# Patient Record
Sex: FEMALE | Race: WHITE | NOT HISPANIC OR LATINO | Employment: OTHER | URBAN - METROPOLITAN AREA
[De-identification: names, ages, dates, MRNs, and addresses within clinical notes are randomized per-mention and may not be internally consistent; named-entity substitution may affect disease eponyms.]

---

## 2017-04-12 ENCOUNTER — ALLSCRIPTS OFFICE VISIT (OUTPATIENT)
Dept: OTHER | Facility: OTHER | Age: 82
End: 2017-04-12

## 2017-04-12 DIAGNOSIS — E78.5 HYPERLIPIDEMIA: ICD-10-CM

## 2018-01-14 VITALS
DIASTOLIC BLOOD PRESSURE: 84 MMHG | WEIGHT: 147 LBS | HEIGHT: 61 IN | SYSTOLIC BLOOD PRESSURE: 138 MMHG | TEMPERATURE: 97.9 F | HEART RATE: 67 BPM | OXYGEN SATURATION: 94 % | BODY MASS INDEX: 27.75 KG/M2

## 2020-12-21 ENCOUNTER — TELEPHONE (OUTPATIENT)
Dept: OTHER | Facility: OTHER | Age: 85
End: 2020-12-21

## 2020-12-22 ENCOUNTER — NURSING HOME VISIT (OUTPATIENT)
Dept: GERIATRICS | Facility: OTHER | Age: 85
End: 2020-12-22
Payer: MEDICARE

## 2020-12-22 DIAGNOSIS — Z51.5 HOSPICE CARE PATIENT: ICD-10-CM

## 2020-12-22 DIAGNOSIS — M54.16 LUMBAR RADICULOPATHY: Primary | ICD-10-CM

## 2020-12-22 DIAGNOSIS — I10 BENIGN ESSENTIAL HYPERTENSION: ICD-10-CM

## 2020-12-22 DIAGNOSIS — D64.9 ANEMIA, UNSPECIFIED TYPE: ICD-10-CM

## 2020-12-22 DIAGNOSIS — H91.93 BILATERAL HEARING LOSS, UNSPECIFIED HEARING LOSS TYPE: ICD-10-CM

## 2020-12-22 DIAGNOSIS — N18.30 STAGE 3 CHRONIC KIDNEY DISEASE, UNSPECIFIED WHETHER STAGE 3A OR 3B CKD (HCC): ICD-10-CM

## 2020-12-22 DIAGNOSIS — F41.8 DEPRESSION WITH ANXIETY: ICD-10-CM

## 2020-12-22 DIAGNOSIS — R53.81 DEBILITY: ICD-10-CM

## 2020-12-22 PROCEDURE — 99306 1ST NF CARE HIGH MDM 50: CPT | Performed by: FAMILY MEDICINE

## 2020-12-27 PROBLEM — F41.8 DEPRESSION WITH ANXIETY: Status: ACTIVE | Noted: 2020-12-27

## 2020-12-27 PROBLEM — D64.9 ANEMIA: Status: ACTIVE | Noted: 2020-12-27

## 2020-12-27 PROBLEM — N18.30 CKD (CHRONIC KIDNEY DISEASE) STAGE 3, GFR 30-59 ML/MIN (HCC): Status: ACTIVE | Noted: 2020-12-27

## 2020-12-27 PROBLEM — K21.9 GERD (GASTROESOPHAGEAL REFLUX DISEASE): Status: ACTIVE | Noted: 2020-12-27

## 2020-12-27 PROBLEM — R53.81 DEBILITY: Status: ACTIVE | Noted: 2020-12-27

## 2020-12-27 PROBLEM — Z51.5 HOSPICE CARE PATIENT: Status: ACTIVE | Noted: 2020-12-27

## 2020-12-27 RX ORDER — MAG HYDROX/ALUMINUM HYD/SIMETH 400-400-40
30 SUSPENSION, ORAL (FINAL DOSE FORM) ORAL DAILY PRN
COMMUNITY
End: 2022-03-06

## 2020-12-27 RX ORDER — TRAMADOL HYDROCHLORIDE 50 MG/1
50 TABLET ORAL 4 TIMES DAILY
COMMUNITY
End: 2021-01-05 | Stop reason: SDUPTHER

## 2020-12-27 RX ORDER — SODIUM PHOSPHATE, DIBASIC AND SODIUM PHOSPHATE, MONOBASIC 7; 19 G/133ML; G/133ML
1 ENEMA RECTAL
COMMUNITY

## 2020-12-27 RX ORDER — HALOPERIDOL 2 MG/ML
0.5 SOLUTION ORAL EVERY 6 HOURS PRN
COMMUNITY
End: 2021-07-13

## 2020-12-27 RX ORDER — LIDOCAINE 40 MG/G
1 CREAM TOPICAL 2 TIMES DAILY
COMMUNITY

## 2020-12-27 RX ORDER — PROCHLORPERAZINE MALEATE 10 MG
10 TABLET ORAL EVERY 6 HOURS PRN
COMMUNITY
End: 2021-07-13

## 2020-12-27 RX ORDER — ACETAMINOPHEN 650 MG/1
650 SUPPOSITORY RECTAL EVERY 6 HOURS PRN
COMMUNITY

## 2020-12-27 RX ORDER — LORAZEPAM 0.5 MG/1
0.5 TABLET ORAL EVERY 6 HOURS PRN
COMMUNITY
End: 2021-08-19

## 2020-12-27 RX ORDER — ESCITALOPRAM OXALATE 10 MG/1
10 TABLET ORAL DAILY
COMMUNITY

## 2020-12-27 RX ORDER — BISACODYL 10 MG
10 SUPPOSITORY, RECTAL RECTAL DAILY PRN
COMMUNITY

## 2020-12-27 RX ORDER — QUETIAPINE FUMARATE 25 MG/1
25 TABLET, FILM COATED ORAL
COMMUNITY
End: 2022-02-18

## 2020-12-27 RX ORDER — MORPHINE SULFATE 20 MG/ML
0.25 SOLUTION ORAL
COMMUNITY
End: 2021-08-19

## 2020-12-27 RX ORDER — GABAPENTIN 100 MG/1
100 CAPSULE ORAL 3 TIMES DAILY
COMMUNITY

## 2020-12-27 RX ORDER — ACETAMINOPHEN 325 MG/1
650 TABLET ORAL EVERY 4 HOURS PRN
COMMUNITY

## 2021-01-05 DIAGNOSIS — M54.16 LUMBAR RADICULOPATHY: Primary | ICD-10-CM

## 2021-01-05 RX ORDER — TRAMADOL HYDROCHLORIDE 50 MG/1
50 TABLET ORAL 4 TIMES DAILY
Qty: 120 TABLET | Refills: 0 | Status: SHIPPED | OUTPATIENT
Start: 2021-01-05 | End: 2021-02-03 | Stop reason: SDUPTHER

## 2021-01-05 NOTE — TELEPHONE ENCOUNTER
Tramadol 50mg PO QID sent to [address] [place] [address]; one month supply, no refills  Hospice patient; PDMP reviewed

## 2021-01-28 ENCOUNTER — NURSING HOME VISIT (OUTPATIENT)
Dept: GERIATRICS | Facility: OTHER | Age: 86
End: 2021-01-28
Payer: MEDICARE

## 2021-01-28 DIAGNOSIS — F41.8 DEPRESSION WITH ANXIETY: ICD-10-CM

## 2021-01-28 DIAGNOSIS — M54.16 LUMBAR RADICULOPATHY: Primary | ICD-10-CM

## 2021-01-28 DIAGNOSIS — R53.81 DEBILITY: ICD-10-CM

## 2021-01-28 DIAGNOSIS — Z51.5 HOSPICE CARE PATIENT: ICD-10-CM

## 2021-01-28 DIAGNOSIS — N18.30 STAGE 3 CHRONIC KIDNEY DISEASE, UNSPECIFIED WHETHER STAGE 3A OR 3B CKD (HCC): ICD-10-CM

## 2021-01-28 PROCEDURE — 99308 SBSQ NF CARE LOW MDM 20: CPT | Performed by: FAMILY MEDICINE

## 2021-01-28 NOTE — PROGRESS NOTES
[facility]  Progress Note  POS: Nursing Facility/LT-32  Hospice Patient    Unable to obtain from patient due to: Additional history obtained speaking with nursing/nursing note review  Chief Complaint/Reason for visit: Follow up of chronic medical conditions  History of Present Illness: 80year old female evaluated for routine follow up of chronic medical conditions  Continues on hospice care  No pain or discomfort reported  Past Medical History: unchanged from history and physical  Past Medical History:   Diagnosis Date    Depression     GERD (gastroesophageal reflux disease)     Hyperlipidemia     Hypertension      Family History: unchanged from history and physical  Social History: unchanged from history and physical  Resident Since: 12/21/20  Review of systems: Review of Systems   Constitutional: Negative for fever  Respiratory: Negative for cough  Cardiovascular: Negative for leg swelling  Psychiatric/Behavioral: Negative for behavioral problems  All other systems reviewed and are negative  Medications: No changes made- Medication list reviewed and updated in Epic to reflect most current SNF orders  Allergies: NKDA  Consults reviewed: N/A  Labs/Diagnostics (reviewed by this provider): N/A    Physical Exam    Temp:97 3F Resp:16 O2 Sat:95% RA  Constitutional: Normocephalic    Physical Exam  Vitals signs and nursing note reviewed  Constitutional:       General: She is sleeping  She is not in acute distress  Appearance: Normal appearance  She is well-developed and well-groomed  She is not ill-appearing, toxic-appearing or diaphoretic  HENT:      Head: Normocephalic and atraumatic  Right Ear: External ear normal       Left Ear: External ear normal       Nose: No rhinorrhea  Mouth/Throat:      Mouth: Mucous membranes are moist    Eyes:      General:         Right eye: No discharge  Left eye: No discharge     Neck: Musculoskeletal: No neck rigidity  Pulmonary:      Effort: Pulmonary effort is normal  No respiratory distress  Musculoskeletal:         General: No deformity  Right lower leg: No edema  Left lower leg: No edema  Skin:     Coloration: Skin is not jaundiced or pale  Neurological:      General: No focal deficit present  Mental Status: Mental status is at baseline         Assessment/Plan:  80year old female with:    Lumbar radiculopathy  With chronic pain and chronic opioid use- on scheduled tramadol QID in addition to gabapentin TID with good pain control  Continue topical lidocaine for leg pain  Associated ambulatory dysfunction    Depression with anxiety  With psychosis; symptoms well controlled with seroquel QHS  Continues on lexapro  Psychosocial support    CKD (chronic kidney disease) stage 3, GFR 30-59 ml/min       Debility  Multifactorial  Continue LTC for 24/7 and ADL care  Supportive care, nutritional support  Fall precautions    Hospice care patient  ROWENA/ Dutton 23, DO  1/28/21

## 2021-02-01 NOTE — ASSESSMENT & PLAN NOTE
Multifactorial  Continue LTC for 24/7 and ADL care  Supportive care, nutritional support  Fall precautions

## 2021-02-01 NOTE — ASSESSMENT & PLAN NOTE
With psychosis; symptoms well controlled with seroquel QHS  Continues on lexapro  Psychosocial support

## 2021-02-01 NOTE — ASSESSMENT & PLAN NOTE
With chronic pain and chronic opioid use- on scheduled tramadol QID in addition to gabapentin TID with good pain control  Continue topical lidocaine for leg pain  Associated ambulatory dysfunction

## 2021-02-03 DIAGNOSIS — M54.16 LUMBAR RADICULOPATHY: ICD-10-CM

## 2021-02-03 RX ORDER — TRAMADOL HYDROCHLORIDE 50 MG/1
50 TABLET ORAL 4 TIMES DAILY
Qty: 120 TABLET | Refills: 0 | Status: SHIPPED | OUTPATIENT
Start: 2021-02-03 | End: 2021-02-17 | Stop reason: SDUPTHER

## 2021-02-03 NOTE — TELEPHONE ENCOUNTER
Tramadol 50mg PO QID sent to [address] [place] [address]; one month supply, no refills  Hospice patient; PDMP reviewed

## 2021-02-17 DIAGNOSIS — M54.16 LUMBAR RADICULOPATHY: ICD-10-CM

## 2021-02-17 RX ORDER — TRAMADOL HYDROCHLORIDE 50 MG/1
50 TABLET ORAL 4 TIMES DAILY
Qty: 120 TABLET | Refills: 0 | Status: SHIPPED | OUTPATIENT
Start: 2021-02-17 | End: 2021-03-09 | Stop reason: SDUPTHER

## 2021-02-25 ENCOUNTER — NURSING HOME VISIT (OUTPATIENT)
Dept: GERIATRICS | Facility: OTHER | Age: 86
End: 2021-02-25
Payer: MEDICARE

## 2021-02-25 DIAGNOSIS — M54.16 LUMBAR RADICULOPATHY: ICD-10-CM

## 2021-02-25 DIAGNOSIS — F41.8 DEPRESSION WITH ANXIETY: Primary | ICD-10-CM

## 2021-02-25 DIAGNOSIS — Z51.5 HOSPICE CARE PATIENT: ICD-10-CM

## 2021-02-25 DIAGNOSIS — R53.81 DEBILITY: ICD-10-CM

## 2021-02-25 PROCEDURE — 99309 SBSQ NF CARE MODERATE MDM 30: CPT | Performed by: FAMILY MEDICINE

## 2021-02-25 NOTE — PROGRESS NOTES
[facility]  Progress Note  POS: Nursing Facility/LT-32  Hospice Patient    Unable to obtain from patient due to: History obtained from patient along with speaking with nursing/nursing note review  Chief Complaint/Reason for visit: Follow up of chronic medical conditions   History of Present Illness: 80year old female evaluated for routine follow up of chronic medical conditions  Continues on hospice care  Denies pain or discomfort  No concerns reported over past month  Past Medical History: unchanged from history and physical  Past Medical History:   Diagnosis Date    Depression     GERD (gastroesophageal reflux disease)     Hyperlipidemia     Hypertension      Family History: unchanged from history and physical  Social History: unchanged from history and physical  Resident Since: 12/21/20  Review of systems: Review of Systems   Constitutional: Negative for chills and fever  Respiratory: Negative for cough and shortness of breath  Musculoskeletal: Negative for arthralgias  Psychiatric/Behavioral: Negative for agitation and behavioral problems  All other systems reviewed and are negative  Medications: No changes made- Medication list reviewed and updated in Epic to reflect most current SNF orders  Allergies: NKDA  Consults reviewed: Hospice  Labs/Diagnostics (reviewed by this provider): N/A    Physical Exam    Weight: 139 5lb Temp:97 2F BP:116/68  Pulse:78 Resp:18 O2 Sat:96%RA  Constitutional: Well-nourished and Normocephalic  Orientation:Person and Place     Physical Exam  Vitals signs and nursing note reviewed  Constitutional:       General: She is awake  She is not in acute distress  Appearance: She is well-developed and well-groomed  She is not ill-appearing, toxic-appearing or diaphoretic  HENT:      Head: Normocephalic and atraumatic  Right Ear: External ear normal       Left Ear: External ear normal       Nose: No rhinorrhea  Mouth/Throat:      Mouth: Mucous membranes are moist       Pharynx: Oropharynx is clear  Eyes:      General: No scleral icterus  Right eye: No discharge  Left eye: No discharge  Conjunctiva/sclera: Conjunctivae normal    Neck:      Musculoskeletal: Neck supple  No neck rigidity  Cardiovascular:      Rate and Rhythm: Normal rate and regular rhythm  Heart sounds: S1 normal and S2 normal    Pulmonary:      Effort: Pulmonary effort is normal  No respiratory distress  Breath sounds: No wheezing  Abdominal:      General: There is no distension  Palpations: Abdomen is soft  Tenderness: There is no abdominal tenderness  Musculoskeletal:         General: No tenderness  Right lower leg: No edema  Left lower leg: No edema  Skin:     General: Skin is warm and dry  Coloration: Skin is not jaundiced or pale  Neurological:      General: No focal deficit present  Mental Status: She is alert  Mental status is at baseline  Cranial Nerves: No cranial nerve deficit  Psychiatric:         Attention and Perception: Attention and perception normal          Mood and Affect: Mood and affect normal          Speech: Speech normal          Behavior: Behavior is cooperative         Assessment/Plan:  80year old female with:    Depression with anxiety  With psychosis; symptoms well controlled with seroquel QHS  Continues on lexapro  Psychosocial support    Lumbar radiculopathy  With chronic pain and chronic opioid use- on scheduled tramadol QID in addition to gabapentin TID with good pain control  Continue topical lidocaine for leg pain  Associated ambulatory dysfunction    Debility  Multifactorial  Continue LTC for 24/7 and ADL care  Supportive care, nutritional support  Fall precautions    Hospice care patient  ROWENA/ Marija 23, DO  2/25/21

## 2021-03-09 DIAGNOSIS — M54.16 LUMBAR RADICULOPATHY: ICD-10-CM

## 2021-03-09 RX ORDER — TRAMADOL HYDROCHLORIDE 50 MG/1
50 TABLET ORAL 4 TIMES DAILY
Qty: 120 TABLET | Refills: 0 | Status: SHIPPED | OUTPATIENT
Start: 2021-03-09 | End: 2021-03-24

## 2021-03-23 ENCOUNTER — NURSING HOME VISIT (OUTPATIENT)
Dept: GERIATRICS | Facility: OTHER | Age: 86
End: 2021-03-23
Payer: MEDICARE

## 2021-03-23 DIAGNOSIS — Z51.5 HOSPICE CARE PATIENT: ICD-10-CM

## 2021-03-23 DIAGNOSIS — R53.81 DEBILITY: ICD-10-CM

## 2021-03-23 DIAGNOSIS — F41.8 DEPRESSION WITH ANXIETY: ICD-10-CM

## 2021-03-23 DIAGNOSIS — I10 BENIGN ESSENTIAL HYPERTENSION: ICD-10-CM

## 2021-03-23 DIAGNOSIS — M15.9 OSTEOARTHRITIS OF MULTIPLE JOINTS, UNSPECIFIED OSTEOARTHRITIS TYPE: ICD-10-CM

## 2021-03-23 DIAGNOSIS — M54.16 LUMBAR RADICULOPATHY: Primary | ICD-10-CM

## 2021-03-23 PROCEDURE — 99309 SBSQ NF CARE MODERATE MDM 30: CPT | Performed by: FAMILY MEDICINE

## 2021-03-23 NOTE — PROGRESS NOTES
[facility]  Progress Note  POS: Nursing Facility/LT-32  Hospice Patient    Unable to obtain from patient due to: History obtained from patient  Additional history obtained speaking with nursing/nursing note review  Chief Complaint/Reason for visit: Follow up of chronic medical conditions   History of Present Illness: 80year old female evaluated for routine follow up of chronic medical conditions  Continues on scheduled acetaminophen and tramadol for chronic pain; pain rating consistently low at 0-2/10  Past Medical History: unchanged from history and physical  Family History: unchanged from history and physical  Social History: unchanged from history and physical  Resident Since: 12/21/20  Review of systems: Review of Systems   Constitutional: Negative for fever and unexpected weight change  Respiratory: Negative for cough and shortness of breath  Cardiovascular: Negative for leg swelling  Musculoskeletal: Negative for arthralgias  Psychiatric/Behavioral: Negative for behavioral problems and sleep disturbance  The patient is not nervous/anxious  All other systems reviewed and are negative  Medications: Changes made- see written orders  Medication list reviewed and updated to reflect most current SNF orders  Allergies: NKDA    Physical Exam    Weight: 143 5lb Temp:97 5F BP:147/80  Pulse:72 Resp:18 O2 Sat:95%RA  Constitutional: Normocephalic  Orientation:Person     Physical Exam  Vitals signs and nursing note reviewed  Constitutional:       General: She is awake  She is not in acute distress  Appearance: She is well-developed and well-groomed  She is not ill-appearing, toxic-appearing or diaphoretic  HENT:      Head: Normocephalic and atraumatic  Right Ear: External ear normal       Left Ear: External ear normal       Nose: No rhinorrhea  Mouth/Throat:      Mouth: Mucous membranes are moist       Pharynx: Oropharynx is clear     Eyes: General: No scleral icterus  Right eye: No discharge  Left eye: No discharge  Conjunctiva/sclera: Conjunctivae normal    Neck:      Musculoskeletal: Neck supple  No neck rigidity  Cardiovascular:      Rate and Rhythm: Normal rate and regular rhythm  Heart sounds: S1 normal and S2 normal    Pulmonary:      Effort: Pulmonary effort is normal  No respiratory distress  Breath sounds: No wheezing  Abdominal:      General: There is no distension  Palpations: Abdomen is soft  Musculoskeletal:         General: No deformity  Right lower leg: No edema  Left lower leg: No edema  Skin:     General: Skin is warm and dry  Coloration: Skin is not jaundiced or pale  Neurological:      General: No focal deficit present  Mental Status: She is alert  Mental status is at baseline  Cranial Nerves: No cranial nerve deficit  Psychiatric:         Mood and Affect: Mood normal  Affect is flat  Speech: Speech normal          Behavior: Behavior is cooperative         Assessment/Plan:  80year old female with:    Lumbar radiculopathy  With chronic pain and chronic opioid use- on scheduled acetaminophen BID, tramadol QID in addition to gabapentin TID with consistently low pain ratings  Dose reduce tramadol to 25mg QID with further dose reduction based on pain levels; if stable, plan to change to BID dosing at next visit  Continue topical lidocaine for leg pain  Associated ambulatory dysfunction    Benign essential hypertension  At goal <150/90, without antihypertensive medication    Debility  Multifactorial  Continue LTC for 24/7 and ADL care  Supportive care, nutritional support  Fall precautions    Depression with anxiety  With psychosis; symptoms well controlled with seroquel QHS  Continues on lexapro  Psychosocial support    Hospice care patient  ROWENA/ Marija 23, DO  3/23/21

## 2021-03-24 RX ORDER — TRAMADOL HYDROCHLORIDE 5 MG/ML
25 SOLUTION ORAL 4 TIMES DAILY
Qty: 473 ML | Refills: 0 | Status: SHIPPED | OUTPATIENT
Start: 2021-03-24 | End: 2021-04-06

## 2021-03-30 RX ORDER — ACETAMINOPHEN 325 MG/1
650 TABLET ORAL 2 TIMES DAILY
COMMUNITY

## 2021-03-30 RX ORDER — SENNA PLUS 8.6 MG/1
1 TABLET ORAL 2 TIMES DAILY
COMMUNITY

## 2021-03-31 NOTE — ASSESSMENT & PLAN NOTE
With chronic pain and chronic opioid use- on scheduled acetaminophen BID, tramadol QID in addition to gabapentin TID with consistently low pain ratings  Dose reduce tramadol to 25mg QID with further dose reduction based on pain levels; if stable, plan to change to BID dosing at next visit  Continue topical lidocaine for leg pain  Associated ambulatory dysfunction

## 2021-04-06 DIAGNOSIS — M15.9 OSTEOARTHRITIS OF MULTIPLE JOINTS, UNSPECIFIED OSTEOARTHRITIS TYPE: ICD-10-CM

## 2021-04-06 RX ORDER — TRAMADOL HYDROCHLORIDE 50 MG/1
25 TABLET ORAL 4 TIMES DAILY
Qty: 120 TABLET | Refills: 0 | Status: SHIPPED | OUTPATIENT
Start: 2021-04-06 | End: 2021-05-10 | Stop reason: SDUPTHER

## 2021-04-20 ENCOUNTER — NURSING HOME VISIT (OUTPATIENT)
Dept: GERIATRICS | Facility: OTHER | Age: 86
End: 2021-04-20
Payer: MEDICARE

## 2021-04-20 DIAGNOSIS — F41.8 DEPRESSION WITH ANXIETY: ICD-10-CM

## 2021-04-20 DIAGNOSIS — R53.81 DEBILITY: ICD-10-CM

## 2021-04-20 DIAGNOSIS — G30.1 LATE ONSET ALZHEIMER'S DEMENTIA WITH BEHAVIORAL DISTURBANCE (HCC): ICD-10-CM

## 2021-04-20 DIAGNOSIS — Z51.5 HOSPICE CARE PATIENT: ICD-10-CM

## 2021-04-20 DIAGNOSIS — F02.81 LATE ONSET ALZHEIMER'S DEMENTIA WITH BEHAVIORAL DISTURBANCE (HCC): ICD-10-CM

## 2021-04-20 DIAGNOSIS — M54.16 LUMBAR RADICULOPATHY: Primary | ICD-10-CM

## 2021-04-20 PROCEDURE — 99309 SBSQ NF CARE MODERATE MDM 30: CPT | Performed by: FAMILY MEDICINE

## 2021-04-20 NOTE — PROGRESS NOTES
[facility]  Progress Note  POS: Nursing Facility/LTC-32  Hospice Patient    Unable to obtain from patient due to: Dementia  Chief Complaint/Reason for visit: Follow up of chronic medical conditions  History of Present Illness: 80year old female evaluated for routine follow up of chronic medical conditions  Tolerated dose reduction of tramadol without increase in pain or behaviors reported  Past Medical History: unchanged from history and physical  Family History: unchanged from history and physical  Social History: unchanged from history and physical  Resident Since: 12/21/20  Review of systems: Review of Systems   Unable to perform ROS: Dementia     Medications: No changes made  Medication list reviewed and updated in Epic to reflect most current SNF orders  Allergies: NKDA    Physical Exam    Weight: 143 5lb Temp:97 3F BP:147/80 Pulse:72 Resp:18 O2 Sat:96%RA  Constitutional: Normocephalic  Orientation:Person     Physical Exam  Vitals signs and nursing note reviewed  Constitutional:       General: She is awake  She is not in acute distress  Appearance: She is well-developed and well-groomed  She is not ill-appearing, toxic-appearing or diaphoretic  HENT:      Head: Normocephalic and atraumatic  Right Ear: External ear normal       Left Ear: External ear normal       Nose: No rhinorrhea  Mouth/Throat:      Mouth: Mucous membranes are moist       Pharynx: Oropharynx is clear  Eyes:      General: No scleral icterus  Right eye: No discharge  Left eye: No discharge  Conjunctiva/sclera: Conjunctivae normal    Neck:      Musculoskeletal: No neck rigidity  Pulmonary:      Effort: Pulmonary effort is normal  No respiratory distress  Abdominal:      General: There is no distension  Palpations: Abdomen is soft  Musculoskeletal:         General: No deformity  Right lower leg: No edema  Left lower leg: No edema  Skin:     Coloration: Skin is not jaundiced or pale  Neurological:      Mental Status: She is alert  Mental status is at baseline  Cranial Nerves: No cranial nerve deficit  Psychiatric:         Mood and Affect: Affect is flat  Behavior: Behavior is withdrawn  Behavior is cooperative  Cognition and Memory: Cognition is impaired  Memory is impaired         Assessment/Plan:  80year old female with:    Lumbar radiculopathy  Continue scheduled acetaminophen BID, gabapentin TID  Tolerated dose reduction of tramadol to 25mg QID at last visit- will plan to decrease to BID dosing at next visit if remains stable  Continue topical lidocaine for leg pain    Depression with anxiety  With psychosis; symptoms well controlled with seroquel QHS  Continues on lexapro  Psychosocial support    Dementia ([facility] [address] )  Alzheimer vs mixed type  Continued supportive care    Debility  Multifactorial  Continue LTC for 24/7 and ADL care  Supportive care, nutritional support  Fall precautions    Hospice care patient  D/c PRN haldol, compazine if patient not using  C/ Dutton 23, DO  4/20/21

## 2021-05-01 PROBLEM — F03.90 DEMENTIA (HCC): Status: ACTIVE | Noted: 2021-05-01

## 2021-05-01 NOTE — ASSESSMENT & PLAN NOTE
Continue scheduled acetaminophen BID, gabapentin TID  Tolerated dose reduction of tramadol to 25mg QID at last visit- will plan to decrease to BID dosing at next visit if remains stable  Continue topical lidocaine for leg pain

## 2021-05-10 DIAGNOSIS — M15.9 OSTEOARTHRITIS OF MULTIPLE JOINTS, UNSPECIFIED OSTEOARTHRITIS TYPE: ICD-10-CM

## 2021-05-10 RX ORDER — TRAMADOL HYDROCHLORIDE 50 MG/1
25 TABLET ORAL 4 TIMES DAILY
Qty: 120 TABLET | Refills: 0 | Status: SHIPPED | OUTPATIENT
Start: 2021-05-10 | End: 2021-07-07 | Stop reason: SDUPTHER

## 2021-06-01 ENCOUNTER — HOSPITAL ENCOUNTER (EMERGENCY)
Facility: HOSPITAL | Age: 86
Discharge: NURSING, REHAB OR LONG-TERM CARE | End: 2021-06-01
Attending: EMERGENCY MEDICINE
Payer: MEDICARE

## 2021-06-01 ENCOUNTER — APPOINTMENT (EMERGENCY)
Dept: RADIOLOGY | Facility: HOSPITAL | Age: 86
End: 2021-06-01
Payer: MEDICARE

## 2021-06-01 ENCOUNTER — TELEPHONE (OUTPATIENT)
Dept: OTHER | Facility: OTHER | Age: 86
End: 2021-06-01

## 2021-06-01 VITALS
OXYGEN SATURATION: 100 % | TEMPERATURE: 97.4 F | RESPIRATION RATE: 16 BRPM | DIASTOLIC BLOOD PRESSURE: 79 MMHG | SYSTOLIC BLOOD PRESSURE: 165 MMHG | HEART RATE: 60 BPM | BODY MASS INDEX: 28.07 KG/M2 | HEIGHT: 60 IN | WEIGHT: 143 LBS

## 2021-06-01 DIAGNOSIS — S61.419A SKIN TEAR OF HAND WITHOUT COMPLICATION: Primary | ICD-10-CM

## 2021-06-01 PROCEDURE — 99283 EMERGENCY DEPT VISIT LOW MDM: CPT

## 2021-06-01 PROCEDURE — 90471 IMMUNIZATION ADMIN: CPT

## 2021-06-01 PROCEDURE — 99282 EMERGENCY DEPT VISIT SF MDM: CPT | Performed by: EMERGENCY MEDICINE

## 2021-06-01 PROCEDURE — 12002 RPR S/N/AX/GEN/TRNK2.6-7.5CM: CPT | Performed by: EMERGENCY MEDICINE

## 2021-06-01 PROCEDURE — 90715 TDAP VACCINE 7 YRS/> IM: CPT | Performed by: EMERGENCY MEDICINE

## 2021-06-01 PROCEDURE — 73130 X-RAY EXAM OF HAND: CPT

## 2021-06-01 RX ORDER — LIDOCAINE HYDROCHLORIDE 10 MG/ML
5 INJECTION, SOLUTION EPIDURAL; INFILTRATION; INTRACAUDAL; PERINEURAL ONCE
Status: COMPLETED | OUTPATIENT
Start: 2021-06-01 | End: 2021-06-01

## 2021-06-01 RX ADMIN — LIDOCAINE HYDROCHLORIDE 5 ML: 10 INJECTION, SOLUTION EPIDURAL; INFILTRATION; INTRACAUDAL; PERINEURAL at 11:44

## 2021-06-01 RX ADMIN — TETANUS TOXOID, REDUCED DIPHTHERIA TOXOID AND ACELLULAR PERTUSSIS VACCINE, ADSORBED 0.5 ML: 5; 2.5; 8; 8; 2.5 SUSPENSION INTRAMUSCULAR at 11:44

## 2021-06-01 NOTE — ED PROCEDURE NOTE
Procedure  Laceration repair    Date/Time: 6/1/2021 11:50 AM  Performed by: Cal Cummings MD  Authorized by: Cal Cummings MD   Consent: Verbal consent obtained  Risks and benefits: risks, benefits and alternatives were discussed  Consent given by: patient  Patient identity confirmed: verbally with patient  Time out: Immediately prior to procedure a "time out" was called to verify the correct patient, procedure, equipment, support staff and site/side marked as required  Body area: upper extremity  Location details: right hand  Laceration length: 4 cm  Foreign bodies: no foreign bodies  Anesthesia: local infiltration    Anesthesia:  Local Anesthetic: lidocaine 1% without epinephrine    Sedation:  Patient sedated: no      Wound Dehiscence:  Superficial Wound Dehiscence: simple closure      Procedure Details:  Preparation: Patient was prepped and draped in the usual sterile fashion    Irrigation solution: saline  Amount of cleaning: extensive  Debridement: none  Degree of undermining: none  Skin closure: 4-0 Prolene  Number of sutures: 2  Technique: simple  Approximation: close  Approximation difficulty: simple  Dressing: 4x4 sterile gauze  Patient tolerance: patient tolerated the procedure well with no immediate complications                       Cal Cummings MD  06/01/21 1253

## 2021-06-01 NOTE — ED PROVIDER NOTES
History  Chief Complaint   Patient presents with    Abrasion     pt brought in by EMS pt states a chair fell on her hand and has a little skin tear  Patient is a 20-year-old right-handed female, with a history significant for hypertension, hyperlipidemia, who presents on 06/01, via EMS from [facility], due to right hand injury  Per EMS report, a chair fell on the patient's hand and the physician at the facility wanted the patient to be evaluated at the ED  Per patient, she was in line to get a haircut one hour prior to presenting when a stacked chair fell over and struck her hand  She denies pain, weakness, numbness at this time  No clear exacerbating or remitting factors  Furthermore, patient is without other complaints; she denies fever, headache chest pain, shortness of breath, abdominal pain, polyuria, dysuria      - No language barrier    - History obtained from patient  - There are no limitations to the history obtained  - Previous charting was reviewed          Prior to Admission Medications   Prescriptions Last Dose Informant Patient Reported? Taking?    LORazepam (ATIVAN) 0 5 mg tablet   Yes Yes   Sig: Take 0 5 mg by mouth every 6 (six) hours as needed for anxiety   QUEtiapine (SEROquel) 25 mg tablet   Yes Yes   Sig: Take 25 mg by mouth daily at bedtime   acetaminophen (TYLENOL) 325 mg tablet   Yes Yes   Sig: Take 650 mg by mouth every 4 (four) hours as needed for mild pain, headaches or fever   acetaminophen (TYLENOL) 325 mg tablet   Yes No   Sig: Take 650 mg by mouth 2 (two) times a day   acetaminophen (TYLENOL) 650 mg suppository   Yes Yes   Sig: Insert 650 mg into the rectum every 6 (six) hours as needed for mild pain or fever   bisacodyl (DULCOLAX) 10 mg suppository   Yes Yes   Sig: Insert 10 mg into the rectum daily as needed for constipation   escitalopram (LEXAPRO) 10 mg tablet   Yes Yes   Sig: Take 10 mg by mouth daily   gabapentin (NEURONTIN) 100 mg capsule   Yes Yes   Sig: Take 100 mg by mouth 3 (three) times a day   haloperidol (HALDOL) 1 mg/0 5 mL oral concentrated solution   Yes Yes   Sig: Take 0 5 mL by mouth every 6 (six) hours as needed for agitation   hyoscyamine (LEVSIN/SL) 0 125 mg SL tablet   Yes Yes   Sig: Take 0 125 mg by mouth every 4 (four) hours as needed (secretions)   lidocaine (LMX) 4 % cream   Yes No   Sig: Apply 1 application topically 2 (two) times a day B/l LEs   magnesium hydroxide (Milk of Magnesia) 400 mg/5 mL oral suspension   Yes Yes   Sig: Take 30 mL by mouth daily as needed for constipation   morphine sulfate, concentrate, 100 mg/5mL concentrated solution   Yes Yes   Sig: Take 0 25 mL by mouth every 3 (three) hours as needed for severe pain (SOB)   prochlorperazine (COMPAZINE) 10 mg tablet   Yes Yes   Sig: Take 10 mg by mouth every 6 (six) hours as needed for nausea or vomiting   senna (SENOKOT) 8 6 MG tablet   Yes Yes   Sig: Take 1 tablet by mouth 2 (two) times a day   sodium phosphate-biphosphate (FLEET) 7-19 g 118 mL enema   Yes Yes   Sig: Insert 1 enema into the rectum every third day as needed   traMADol (ULTRAM) 50 mg tablet   No Yes   Sig: Take 0 5 tablets (25 mg total) by mouth 4 (four) times a day      Facility-Administered Medications: None       Past Medical History:   Diagnosis Date    Depression     GERD (gastroesophageal reflux disease)     Hyperlipidemia     Hypertension        History reviewed  No pertinent surgical history  Family History   Family history unknown: Yes     I have reviewed and agree with the history as documented  E-Cigarette/Vaping     E-Cigarette/Vaping Substances     Social History     Tobacco Use    Smoking status: Unknown If Ever Smoked    Smokeless tobacco: Never Used   Substance Use Topics    Alcohol use: Not Currently    Drug use: Not on file        Review of Systems   Constitutional: Negative for fever  HENT: Negative for trouble swallowing  Eyes: Negative for visual disturbance     Respiratory: Negative for shortness of breath  Cardiovascular: Negative for chest pain  Gastrointestinal: Negative for abdominal pain and vomiting  Endocrine: Negative for polyuria  Genitourinary: Negative for dysuria  Musculoskeletal: Positive for gait problem (Chronic, wheelchair at baseline)  Skin: Positive for wound  Negative for rash  Allergic/Immunologic: Negative for environmental allergies  Neurological: Negative for weakness, numbness and headaches  Hematological: Negative for adenopathy  Psychiatric/Behavioral: Negative for confusion  Physical Exam  ED Triage Vitals [06/01/21 1113]   Temperature Pulse Respirations Blood Pressure SpO2   (!) 97 4 °F (36 3 °C) 60 16 165/79 100 %      Temp Source Heart Rate Source Patient Position - Orthostatic VS BP Location FiO2 (%)   Oral Monitor Lying Right arm --      Pain Score       --             Orthostatic Vital Signs  Vitals:    06/01/21 1113   BP: 165/79   Pulse: 60   Patient Position - Orthostatic VS: Lying       Physical Exam  Vitals signs and nursing note reviewed  Constitutional:       General: She is not in acute distress  Appearance: She is not toxic-appearing  HENT:      Head: Normocephalic  Right Ear: External ear normal       Left Ear: External ear normal       Nose: Nose normal  No rhinorrhea  Mouth/Throat:      Mouth: Mucous membranes are moist       Pharynx: Oropharynx is clear  No oropharyngeal exudate or posterior oropharyngeal erythema  Eyes:      General:         Right eye: No discharge  Left eye: No discharge  Comments: Slight anisocoria, pupils equally reactive to light   Neck:      Musculoskeletal: Neck supple  No muscular tenderness  Cardiovascular:      Rate and Rhythm: Normal rate and regular rhythm  Pulses: Normal pulses  Heart sounds: Normal heart sounds  No murmur  No friction rub  No gallop         Comments: 2+ radial and DP  Pulmonary:      Effort: Pulmonary effort is normal  No respiratory distress  Breath sounds: Normal breath sounds  No stridor  No wheezing, rhonchi or rales  Abdominal:      General: There is no distension  Palpations: Abdomen is soft  Tenderness: There is no abdominal tenderness  There is no right CVA tenderness, left CVA tenderness, guarding or rebound  Musculoskeletal:         General: No tenderness  Right lower leg: No edema  Left lower leg: No edema  Comments: Left hand 4th and 5th digit contractures       Lymphadenopathy:      Cervical: No cervical adenopathy  Skin:     General: Skin is warm and dry  Capillary Refill: Capillary refill takes less than 2 seconds  Distal upper extremities  Findings: Bruising and lesion present  Comments: Approximately 4cm base x 3cm height triangular skin tear present on the dorsum of the right hand  Surrounding bruising present  Picture added to chart   Neurological:      Mental Status: She is alert  Comments: AAOx3 - Patient was unsure of year but knew the month  Patient is speaking clearly in complete sentences  Patient is answering appropriately and able follow commands  Patient is moving all four extremities spontaneously  No facial droop  Tongue midline  Left hand contracture as described above  Median, radial, ulnar motor testing difficult to perform secondary to limited finger ROM bilaterally  Patient appears to have intact sensation to light touch      Psychiatric:         Mood and Affect: Mood normal          Behavior: Behavior normal          ED Medications  Medications   lidocaine (PF) (XYLOCAINE-MPF) 1 % injection 5 mL (5 mL Infiltration Given by Other 6/1/21 1144)   tetanus-diphtheria-acellular pertussis (BOOSTRIX) IM injection 0 5 mL (0 5 mL Intramuscular Given 6/1/21 1144)       Diagnostic Studies  Results Reviewed     None                 XR hand 3+ views RIGHT    (Results Pending)         Procedures  Procedures      ED Course  ED Course as of Jun 01 1307   Tue Jun 01, 2021   0496 Patient has neither questions nor concerns after receiving discharge instructions and return precautions                                          [facility]  Number of Diagnoses or Management Options  Diagnosis management comments: Patient is a 80-year-old right-handed female, with a history significant for hypertension, hyperlipidemia, who presents on 06/01, via EMS from [facility], due to right hand injury  Per EMS report, a chair fell on the patient's hand and the physician at the facility wanted the patient to be evaluated at the ED  One hour prior to presenting when a stacked chair fell over and struck her hand  She denies pain, weakness, numbness at this time  No clear exacerbating or remitting factors  Furthermore, patient is without other complaints; she denies fever, headache chest pain, shortness of breath, abdominal pain, polyuria, dysuria  Patient is currently afebrile and hemodynamically stable  Her physical exam is notable for an approximately 4cm base x 3cm height triangular skin tear present on the dorsum of the right hand  Surrounding bruising present  This presentation is concerning for: skin tear, contusion, fracture  Will investigate with CXR  Will manage with suturing, dressing, and tetanus update  Disposition  Final diagnoses:   Skin tear of hand without complication     Time reflects when diagnosis was documented in both MDM as applicable and the Disposition within this note     Time User Action Codes Description Comment    6/1/2021  1:02 PM Inna Malloy Add [[id number]] Skin tear of hand without complication       ED Disposition     ED Disposition Condition Date/Time Comment    Discharge Stable Tue Jun 1, 2021  1:04 PM Marlene Becerril discharge to home/self care              Follow-up Information     Follow up With Specialties Details Why [address], [address] [facility], Nurse Practitioner Schedule an appointment as soon as possible for a visit   [facility]  [phone]            Patient's Medications   Discharge Prescriptions    No medications on file     No discharge procedures on file  PDMP Review       Value Time User    PDMP Reviewed  Yes 3/24/2021  7:25 AM Teresa Ramos,            ED Provider  Attending physically available and evaluated [facility]  I managed the patient along with the ED Attending      Electronically Signed by         Ro Burk MD  06/01/21 6597

## 2021-06-01 NOTE — DISCHARGE INSTRUCTIONS
You were evaluated in the emergency department for: akin tear  You can access your current and pending results through Keenan Welsh  A radiologist will take a second look at your X-Rays, if you had any, and you will be contacted with any new findings       You should follow-up with your primary care provider, in 5-7 days, for re-evaluation and suture removal       Your workup revealed no emergent features at this time; however, many disease processes are dynamic:    Please, return to the emergency department if you experience new or worsening symptoms, fever, chest pain, shortness of breath, difficulty breathing, dizziness, abdominal pain, persistent nausea/vomiting, syncope or passing out, blood in your urine or stool, coughing up blood, leg swelling/pain, urinary retention, bowel or bladder incontinence, numbness between your legs, hand swelling/pain/weakness/numbness, pus-like drainage from your hand

## 2021-06-01 NOTE — ED ATTENDING ATTESTATION
6/1/2021  IAnnel MD, saw and evaluated the patient  I have discussed the patient with the resident/non-physician practitioner and agree with the resident's/non-physician practitioner's findings, Plan of Care, and MDM as documented in the resident's/non-physician practitioner's note, except where noted  All available labs and Radiology studies were reviewed  I was present for key portions of any procedure(s) performed by the resident/non-physician practitioner and I was immediately available to provide assistance  At this point I agree with the current assessment done in the Emergency Department  I have conducted an independent evaluation of this patient a history and physical is as follows:    Patient presents the emergency department with an injury to the right hand  Reportedly a chair fell on her right hand causing a skin tear and mild tenderness  The patient has had no other injuries and no other complaints reported  Physical exam demonstrates an elderly female no acute distress  There is a 4 x 3 centimeter skin tear over the dorsum of the right hand with no deeper structures involved  There is no gross contamination  The patient has normal flexion extension of all digits in the hand  All extremities are neurovascularly intact  There is mild tenderness to the midportion of the hand without any crepitance or deformity    The other extremities are normal     ED Course         Critical Care Time  Procedures

## 2021-06-01 NOTE — TELEPHONE ENCOUNTER
Stephane Zimmerman stated she spoke with Dr Tesfaye Parikh earlier  One of her patients had went to the ED for her hand injury  She wanted to update and say there was no fracture, they sutured her hand and the cut is fine

## 2021-06-30 ENCOUNTER — NURSING HOME VISIT (OUTPATIENT)
Dept: GERIATRICS | Facility: OTHER | Age: 86
End: 2021-06-30
Payer: MEDICARE

## 2021-06-30 DIAGNOSIS — R53.81 DEBILITY: ICD-10-CM

## 2021-06-30 DIAGNOSIS — Z51.5 HOSPICE CARE PATIENT: ICD-10-CM

## 2021-06-30 DIAGNOSIS — G30.1 LATE ONSET ALZHEIMER'S DEMENTIA WITH BEHAVIORAL DISTURBANCE (HCC): Primary | ICD-10-CM

## 2021-06-30 DIAGNOSIS — M15.9 OSTEOARTHRITIS OF MULTIPLE JOINTS, UNSPECIFIED OSTEOARTHRITIS TYPE: ICD-10-CM

## 2021-06-30 DIAGNOSIS — S61.419D: ICD-10-CM

## 2021-06-30 DIAGNOSIS — F02.81 LATE ONSET ALZHEIMER'S DEMENTIA WITH BEHAVIORAL DISTURBANCE (HCC): Primary | ICD-10-CM

## 2021-06-30 PROCEDURE — 99309 SBSQ NF CARE MODERATE MDM 30: CPT | Performed by: FAMILY MEDICINE

## 2021-07-01 NOTE — PROGRESS NOTES
[facility]  Progress Note  POS: Nursing Facility/LT-32  Hospice Patient    Chief Complaint/Reason for visit: Follow up of chronic medical conditions   History of Present Illness: 80year old female evaluated for routine follow up of chronic medical conditions  Suffered right hand laceration earlier this month requiring ED visit for suture repair  Continues on hospice care  Past Medical History: unchanged from history and physical  Family History: unchanged from history and physical  Social History: unchanged from history and physical  Resident Since: 12/21/20  Review of systems: Review of Systems   Unable to perform ROS: Dementia   Constitutional: Negative for fever and unexpected weight change  Medications: Changes made- see written orders  Medication list reviewed and updated in Epic to reflect most current SNF orders  Allergies: NKDA    Physical Exam    Weight: 148lb Temp:97 6F BP:147/80 Pulse:72 Resp:18 O2 Sat:94%RA  Constitutional: Normocephalic  Orientation:Person     Physical Exam  Vitals and nursing note reviewed  Constitutional:       General: She is awake  She is not in acute distress  Appearance: She is not ill-appearing, toxic-appearing or diaphoretic  HENT:      Head: Normocephalic and atraumatic  Right Ear: External ear normal       Left Ear: External ear normal       Nose: No rhinorrhea  Mouth/Throat:      Mouth: Mucous membranes are moist    Eyes:      General: No scleral icterus  Right eye: No discharge  Left eye: No discharge  Conjunctiva/sclera: Conjunctivae normal    Pulmonary:      Effort: Pulmonary effort is normal  No respiratory distress  Abdominal:      General: There is no distension  Musculoskeletal:      Cervical back: No rigidity  Skin:     Coloration: Skin is pale  Skin is not jaundiced  Neurological:      Mental Status: She is alert  Mental status is at baseline  Cranial Nerves:  No facial asymmetry  Motor: No tremor  Psychiatric:         Behavior: Behavior is withdrawn  Cognition and Memory: Cognition is impaired  Memory is impaired         Assessment/Plan:  80year old female with:    Dementia ([facility] [address] )  Alzheimer vs mixed type  With progressive decline  Continued supportive care, LTC for 24/7 care and safety    Osteoarthritis  Generalized OA along with chronic lumbar radiculopathy  Continue scheduled acetaminophen, gabapentin, low dose tramadol  Continue topical lidocaine for leg pain    Skin tear of hand without complication  S/p ED visit for suture repair    Debility  Multifactorial  Continue LTC for 24/7 and ADL care  Supportive care, nutritional support  Fall precautions    Hospice care patient  ROWENA/ Marija 23, DO  6/30/21

## 2021-07-07 DIAGNOSIS — M15.9 OSTEOARTHRITIS OF MULTIPLE JOINTS, UNSPECIFIED OSTEOARTHRITIS TYPE: ICD-10-CM

## 2021-07-07 RX ORDER — TRAMADOL HYDROCHLORIDE 50 MG/1
25 TABLET ORAL 4 TIMES DAILY
Qty: 120 TABLET | Refills: 0 | Status: SHIPPED | OUTPATIENT
Start: 2021-07-07 | End: 2021-08-04 | Stop reason: SDUPTHER

## 2021-07-13 PROBLEM — S61.419A SKIN TEAR OF HAND WITHOUT COMPLICATION: Status: ACTIVE | Noted: 2021-07-13

## 2021-07-13 RX ORDER — FUROSEMIDE 20 MG/1
20 TABLET ORAL DAILY
COMMUNITY
Start: 2021-06-20

## 2021-07-13 NOTE — ASSESSMENT & PLAN NOTE
Generalized OA along with chronic lumbar radiculopathy  Continue scheduled acetaminophen, gabapentin, low dose tramadol  Continue topical lidocaine for leg pain

## 2021-07-13 NOTE — ASSESSMENT & PLAN NOTE
Alzheimer vs mixed type  With progressive decline  Continued supportive care, LTC for 24/7 care and safety

## 2021-08-04 DIAGNOSIS — M15.9 OSTEOARTHRITIS OF MULTIPLE JOINTS, UNSPECIFIED OSTEOARTHRITIS TYPE: ICD-10-CM

## 2021-08-04 RX ORDER — TRAMADOL HYDROCHLORIDE 50 MG/1
25 TABLET ORAL 4 TIMES DAILY
Qty: 120 TABLET | Refills: 0 | Status: SHIPPED | OUTPATIENT
Start: 2021-08-04 | End: 2021-08-19

## 2021-08-17 ENCOUNTER — NURSING HOME VISIT (OUTPATIENT)
Dept: GERIATRICS | Facility: OTHER | Age: 86
End: 2021-08-17
Payer: MEDICARE

## 2021-08-17 DIAGNOSIS — M54.16 LUMBAR RADICULOPATHY: Primary | ICD-10-CM

## 2021-08-17 DIAGNOSIS — Z79.899 ENCOUNTER FOR MEDICATION REVIEW: ICD-10-CM

## 2021-08-17 DIAGNOSIS — G30.1 LATE ONSET ALZHEIMER'S DEMENTIA WITH BEHAVIORAL DISTURBANCE (HCC): ICD-10-CM

## 2021-08-17 DIAGNOSIS — I10 BENIGN ESSENTIAL HYPERTENSION: ICD-10-CM

## 2021-08-17 DIAGNOSIS — F02.81 LATE ONSET ALZHEIMER'S DEMENTIA WITH BEHAVIORAL DISTURBANCE (HCC): ICD-10-CM

## 2021-08-17 DIAGNOSIS — F41.8 DEPRESSION WITH ANXIETY: ICD-10-CM

## 2021-08-17 PROCEDURE — 99309 SBSQ NF CARE MODERATE MDM 30: CPT | Performed by: FAMILY MEDICINE

## 2021-08-17 NOTE — PROGRESS NOTES
[facility]  Progress Note  POS: Nursing Facility/LTC-    Chief Complaint/Reason for visit: Follow up of chronic medical conditions  History of Present Illness: 80year old female evaluated for routine follow up of chronic medical conditions  Has transitioned off of hospice care  No complaints of pain or discomfort since discontinuation of tramadol  Past Medical History: unchanged from history and physical  Family History: unchanged from history and physical  Social History: unchanged from history and physical  Resident Since: 12/21/20  Review of systems: Review of Systems   Unable to perform ROS: Dementia     Medications: Changes made- see written orders  Medication list reviewed and updated in Epic to reflect most current SNF orders  Allergies: NKDA    Physical Exam    Weight: 148lb Temp:97 3F BP:132/74 Pulse:76 Resp:18 O2 Sat:95%RA  Constitutional: Normocephalic    Physical Exam  Vitals and nursing note reviewed  Constitutional:       General: She is awake  She is not in acute distress  Appearance: She is well-developed and well-groomed  She is not ill-appearing, toxic-appearing or diaphoretic  HENT:      Head: Normocephalic and atraumatic  Right Ear: External ear normal       Left Ear: External ear normal       Nose: No rhinorrhea  Mouth/Throat:      Mouth: Mucous membranes are moist    Eyes:      General: No scleral icterus  Right eye: No discharge  Left eye: No discharge  Conjunctiva/sclera: Conjunctivae normal    Pulmonary:      Effort: Pulmonary effort is normal  No respiratory distress  Abdominal:      General: There is no distension  Musculoskeletal:         General: No deformity  Cervical back: No rigidity  Right lower leg: No edema  Left lower leg: No edema  Skin:     Coloration: Skin is not jaundiced or pale  Neurological:      Mental Status: She is alert  Mental status is at baseline  Cranial Nerves: No facial asymmetry  Psychiatric:         Mood and Affect: Affect is flat         Assessment/Plan:  80year old female with:    Lumbar radiculopathy  Tramadol discontinued  Continue scheduled acetaminophen, gabapentin    Benign essential hypertension  Continues on lasix, examines euvolemic  SBP generally trending in 120s    Depression with anxiety  With psychosis; symptoms well controlled with seroquel QHS; if remains stable, consider GDR to 12 5mg QHS  Continue lexapro  Psychosocial support    Dementia ([facility] [address] )  Alzheimer vs mixed type  With progressive decline  Continued supportive care, LTC for 24/7 care and safety    Encounter for medication review  Patient no longer on hospice services; d/c PRN morphine, lorazepam, hyoscyamine     Hyacinth Rabenold, DO  8/17/21

## 2021-08-19 PROBLEM — Z79.899 ENCOUNTER FOR MEDICATION REVIEW: Status: ACTIVE | Noted: 2021-08-19

## 2021-08-19 NOTE — ASSESSMENT & PLAN NOTE
With psychosis; symptoms well controlled with seroquel QHS; if remains stable, consider GDR to 12 5mg QHS  Continue lexapro  Psychosocial support

## 2021-10-22 ENCOUNTER — NURSING HOME VISIT (OUTPATIENT)
Dept: GERIATRICS | Facility: OTHER | Age: 86
End: 2021-10-22
Payer: MEDICARE

## 2021-10-22 VITALS
OXYGEN SATURATION: 96 % | SYSTOLIC BLOOD PRESSURE: 138 MMHG | DIASTOLIC BLOOD PRESSURE: 74 MMHG | RESPIRATION RATE: 18 BRPM | TEMPERATURE: 97.5 F | HEART RATE: 84 BPM

## 2021-10-22 DIAGNOSIS — G30.1 LATE ONSET ALZHEIMER'S DEMENTIA WITH BEHAVIORAL DISTURBANCE (HCC): ICD-10-CM

## 2021-10-22 DIAGNOSIS — M15.9 OSTEOARTHRITIS OF MULTIPLE JOINTS, UNSPECIFIED OSTEOARTHRITIS TYPE: ICD-10-CM

## 2021-10-22 DIAGNOSIS — I10 BENIGN ESSENTIAL HYPERTENSION: Primary | ICD-10-CM

## 2021-10-22 DIAGNOSIS — F02.81 LATE ONSET ALZHEIMER'S DEMENTIA WITH BEHAVIORAL DISTURBANCE (HCC): ICD-10-CM

## 2021-10-22 DIAGNOSIS — F41.8 DEPRESSION WITH ANXIETY: ICD-10-CM

## 2021-10-22 DIAGNOSIS — M54.16 LUMBAR RADICULOPATHY: ICD-10-CM

## 2021-10-22 DIAGNOSIS — R53.81 DEBILITY: ICD-10-CM

## 2021-10-22 PROCEDURE — 99309 SBSQ NF CARE MODERATE MDM 30: CPT | Performed by: NURSE PRACTITIONER

## 2022-02-15 ENCOUNTER — NURSING HOME VISIT (OUTPATIENT)
Dept: GERIATRICS | Facility: OTHER | Age: 87
End: 2022-02-15
Payer: MEDICARE

## 2022-02-15 DIAGNOSIS — U07.1 COVID-19: Primary | ICD-10-CM

## 2022-02-15 DIAGNOSIS — G30.1 LATE ONSET ALZHEIMER'S DEMENTIA WITH BEHAVIORAL DISTURBANCE (HCC): ICD-10-CM

## 2022-02-15 DIAGNOSIS — F02.81 LATE ONSET ALZHEIMER'S DEMENTIA WITH BEHAVIORAL DISTURBANCE (HCC): ICD-10-CM

## 2022-02-15 DIAGNOSIS — F41.8 DEPRESSION WITH ANXIETY: ICD-10-CM

## 2022-02-15 DIAGNOSIS — I10 BENIGN ESSENTIAL HYPERTENSION: ICD-10-CM

## 2022-02-15 PROCEDURE — 99309 SBSQ NF CARE MODERATE MDM 30: CPT | Performed by: NURSE PRACTITIONER

## 2022-02-15 NOTE — ASSESSMENT & PLAN NOTE
Alzheimer's versus mixed  Patient requires 24/7 care and support at LTCF for ADLs; IADLs  Continue delirium precautions  Avoid deliriogenic medications  Maintain sleep/wake cycle

## 2022-02-15 NOTE — ASSESSMENT & PLAN NOTE
Patient tested positive today for COVID-19 and moved to the isolation unit  Continue isolation precautions per facility protocol  Check vital signs Q 4 hours times 48 hours  Check CBC, CMP, CRP, and D-dimer in a m    Patient is at risk for rapid deterioration due to age and comorbidities  Continue to monitor closely

## 2022-02-15 NOTE — ASSESSMENT & PLAN NOTE
Patient is under the care of [facility] hospice  Comfort meds as per hospice

## 2022-02-15 NOTE — PROGRESS NOTES
Facility: [facility]  POS:  28 ([facility])  Progress Note    Chief Complaint/Reason for visit:  COVID-19 infection  History obtained from nursing staff and EMR  Patient is a poor historian  History of Present Illness:  59-year-old female seen and examined  Patient tested positive today for COVID-19 and moved to the isolation unit  Received patient resting in bed and she appeared comfortable  Nursing reports that patient is asymptomatic  Vital signs are stable  O2 sat is 94% on room air  Patient was very pleasant at time of visit and answered appropriately to questions  Will monitor closely as patient is at high risk for rapid deterioration due to age and comorbidities  Past Medical History: unchanged from history and physical  Past Medical History:   Diagnosis Date    Depression     GERD (gastroesophageal reflux disease)     Hyperlipidemia     Hypertension      Family History: unchanged from history and physical  Social History: unchanged from history and physical  Resident Since:  12/21/2020  Review of systems: Review of Systems   Unable to perform ROS: Dementia   Constitutional: Negative for appetite change, chills and diaphoresis  HENT: Negative for sore throat and trouble swallowing  Respiratory: Negative for cough and shortness of breath  Gastrointestinal: Negative for abdominal pain  Incontinent of bowel   Endocrine: Negative  Genitourinary:        Incontinent of urine   Musculoskeletal: Positive for gait problem  Neurological: Negative for speech difficulty and headaches  Psychiatric/Behavioral: Negative for agitation, behavioral problems, dysphoric mood, hallucinations and sleep disturbance  The patient is not nervous/anxious  All other systems reviewed and are negative  Medications: All medication and routine orders were reviewed and updated  Allergies: NKDA  Consults reviewed: Other  Labs/Diagnostics (reviewed by this provider): Copy in Chart electronic medical records    Imaging Reviewed:  None today    Physical Exam    Weight:  Temp:  98         BP:  132/70  Pulse:  70 Resp: 18      O2 Sat:  94% on room air  Constitutional: Normocephalic  Orientation:Person     Physical Exam  Vitals and nursing note reviewed  Constitutional:       General: She is not in acute distress  Appearance: She is not ill-appearing, toxic-appearing or diaphoretic  Comments: Elderly female who appears with chronic illness but in no distress  HENT:      Head: Normocephalic  Ears:      Comments: Hard of hearing     Nose: No congestion or rhinorrhea  Mouth/Throat:      Mouth: Mucous membranes are moist       Pharynx: No oropharyngeal exudate  Eyes:      General: No scleral icterus  Right eye: No discharge  Left eye: No discharge  Extraocular Movements: Extraocular movements intact  Conjunctiva/sclera: Conjunctivae normal       Pupils: Pupils are equal, round, and reactive to light  Cardiovascular:      Rate and Rhythm: Normal rate and regular rhythm  Pulses: Normal pulses  Pulmonary:      Effort: Pulmonary effort is normal  No respiratory distress  Breath sounds: Normal breath sounds  No wheezing, rhonchi or rales  Abdominal:      General: Bowel sounds are normal  There is no distension  Palpations: Abdomen is soft  Tenderness: There is no abdominal tenderness  There is no guarding  Musculoskeletal:      Cervical back: Neck supple  No rigidity  Right lower leg: No edema  Left lower leg: No edema  Comments: Moves all 4 extremities  Lymphadenopathy:      Cervical: No cervical adenopathy  Skin:     General: Skin is warm and dry  Capillary Refill: Capillary refill takes less than 2 seconds  Neurological:      Mental Status: She is alert  Comments: Alert and oriented to self and recalled her birthday accurately  She is aware of current situation and answered to questions appropriately  Psychiatric:         Mood and Affect: Mood normal          Behavior: Behavior normal          Thought Content: Thought content normal        Assessment/Plan:  66-year-old female with:    COVID-19  Patient tested positive today for COVID-19 and moved to the isolation unit  Continue isolation precautions per facility protocol  Check vital signs Q 4 hours times 48 hours  Check CBC, CMP, CRP, and D-dimer in a m  Patient is at risk for rapid deterioration due to age and comorbidities  Continue to monitor closely    Dementia [facility])  Alzheimer's versus mixed  Patient requires 24/7 care and support at LTCF for ADLs; IADLs  Continue delirium precautions  Avoid deliriogenic medications  Maintain sleep/wake cycle    Benign essential hypertension  BPs stable  Continue Lasix 20 mg daily    Depression with anxiety  Mood appears stable  Continue Lexapro 10 mg daily  Continue Seroquel 12 5 mg q h s  This note was completed in part utilizing m-modal fluency direct voice recognition software  Grammatical errors, random word insertion, spelling mistakes, and incomplete sentences may be an occasional consequence of the system secondary to software limitations, ambient noise and hardware issues  At the time of dictation, efforts were made to edit, clarify and/or correct errors  Please read the chart carefully and recognize, using context, where substitutions have occurred  If you have any questions or concerns about the context, text or information contained within the body of this dictation, please contact myself, the provider, for further clarification      Galileo Mathew  7/85/69461:14 PM

## 2022-02-18 ENCOUNTER — NURSING HOME VISIT (OUTPATIENT)
Dept: GERIATRICS | Facility: OTHER | Age: 87
End: 2022-02-18
Payer: MEDICARE

## 2022-02-18 DIAGNOSIS — F02.81 LATE ONSET ALZHEIMER'S DEMENTIA WITH BEHAVIORAL DISTURBANCE (HCC): ICD-10-CM

## 2022-02-18 DIAGNOSIS — U07.1 COVID-19: Primary | ICD-10-CM

## 2022-02-18 DIAGNOSIS — R53.81 DEBILITY: ICD-10-CM

## 2022-02-18 DIAGNOSIS — M15.9 OSTEOARTHRITIS OF MULTIPLE JOINTS, UNSPECIFIED OSTEOARTHRITIS TYPE: ICD-10-CM

## 2022-02-18 DIAGNOSIS — G30.1 LATE ONSET ALZHEIMER'S DEMENTIA WITH BEHAVIORAL DISTURBANCE (HCC): ICD-10-CM

## 2022-02-18 DIAGNOSIS — I34.0 NONRHEUMATIC MITRAL VALVE REGURGITATION: ICD-10-CM

## 2022-02-18 DIAGNOSIS — I10 BENIGN ESSENTIAL HYPERTENSION: ICD-10-CM

## 2022-02-18 DIAGNOSIS — F41.8 DEPRESSION WITH ANXIETY: ICD-10-CM

## 2022-02-18 PROBLEM — Z51.5 HOSPICE CARE PATIENT: Status: RESOLVED | Noted: 2020-12-27 | Resolved: 2022-02-18

## 2022-02-18 PROBLEM — D64.9 ANEMIA: Status: RESOLVED | Noted: 2020-12-27 | Resolved: 2022-02-18

## 2022-02-18 PROBLEM — S61.419A SKIN TEAR OF HAND WITHOUT COMPLICATION: Status: RESOLVED | Noted: 2021-07-13 | Resolved: 2022-02-18

## 2022-02-18 PROCEDURE — 99309 SBSQ NF CARE MODERATE MDM 30: CPT | Performed by: NURSE PRACTITIONER

## 2022-02-18 RX ORDER — QUETIAPINE FUMARATE 25 MG/1
12.5 TABLET, FILM COATED ORAL
Start: 2022-02-18 | End: 2022-04-14

## 2022-02-18 RX ORDER — DEXAMETHASONE 6 MG/1
6 TABLET ORAL DAILY
COMMUNITY
End: 2022-02-27

## 2022-02-19 NOTE — ASSESSMENT & PLAN NOTE
Alzheimer's versus mixed type  Continue 24/7 care and support at LTCF for ADLs; IADLs  No behavior issues reported  Patient requires complete assist with ADLs  Continue delirium precautions  Avoid deliriogenic medications  Maintain sleep/wake cycle  Monitor for pain and ensure that pain is adequately controlled  Monitor bowel bladder function to ensure that patient does not have urinary retention or constipation  Consider discontinuing Seroquel

## 2022-02-19 NOTE — ASSESSMENT & PLAN NOTE
Multifactorial  Continue 24/7 care and support at LTCF  PT/OT/ST as needed  Continue fall precautions  Ensure adequate hydration and nutrition  Management of acute chronic medical conditions

## 2022-02-19 NOTE — ASSESSMENT & PLAN NOTE
Patient tested positive for COVID-19 on 02/15/2022 and currently in the [facility] unit for isolation precautions    Respiratory status is stable on room air  Continue isolation precautions  CMP and CBC stable on 02/16/2022  D-dimer 1 57 and C reactive protein 67 7 on 02/16/2022; patient was started on dexamethasone 6 mg daily and heparin 5000 units subQ b i d  for 10 days  Recheck D-dimer and C-reactive protein next week  Continue vitamin-C, vitamin-D, and zinc for a total of 2 weeks  Continue to monitor closely as patient is at high risk for rapid deterioration due to age and comorbidities

## 2022-02-19 NOTE — ASSESSMENT & PLAN NOTE
Stable  Patient was last seen by Cardiology 2017  Patient appears euvolemic on exam  Continue Lasix 20 mg daily

## 2022-02-19 NOTE — ASSESSMENT & PLAN NOTE
BPs remains stable  Patient is currently not on any antihypertensive medications  Continue Lasix 20 mg daily

## 2022-02-19 NOTE — PROGRESS NOTES
[facility]: [facility]  POS: 28 ([facility])  Progress Note    Chief Complaint/Reason for visit: Covid-19 infection  History obtained from nursing staff and EMR  History of Present Illness:  26-year-old female seen and examined  Patient tested positive for COVID-19 on 02/15/2022 and currently in the [facility] unit for isolation precautions  She is currently on heparin for DVT prophylaxis and dexamethasone 6 mg daily for total 10 days  Received patient resting in bed  She was alert and oriented to self  Patient appeared comfortable  No episodes of respiratory distress reported  Patient denies pain or discomfort at time of exam   Afebrile and hemodynamically stable  Patient had an episode of diarrhea and nursing held senna  Patient is tolerating regular diet with thin liquids  Past Medical History: unchanged from history and physical  Past Medical History:   Diagnosis Date    Depression     GERD (gastroesophageal reflux disease)     Hyperlipidemia     Hypertension      Family History: unchanged from history and physical  Social History: unchanged from history and physical  Resident Since:  12/21/2020  Review of systems: Review of Systems   Unable to perform ROS: Dementia   Constitutional: Negative for appetite change, chills and diaphoresis  HENT: Negative for sore throat and trouble swallowing  Respiratory: Negative for cough and shortness of breath  Cardiovascular: Negative for chest pain  Gastrointestinal: Negative for abdominal pain  Musculoskeletal: Negative for back pain  Neurological: Negative for dizziness and headaches  Psychiatric/Behavioral: Negative for agitation, behavioral problems and hallucinations  The patient is not nervous/anxious  Medications: All medication and routine orders were reviewed and updated  Allergies: NKDA  Consults reviewed: Other  Labs/Diagnostics (reviewed by this provider): Copy in Chart electronic medical records    Imaging Reviewed:  None today    Physical Exam    Weight:  Temp: 97 2          BP:  139/82  Pulse:  62 Resp:  18       O2 Sat:  92% on room air  Constitutional: Normocephalic  Orientation:Person     Physical Exam  Vitals and nursing note reviewed  Constitutional:       General: She is not in acute distress  Appearance: She is not ill-appearing, toxic-appearing or diaphoretic  Comments: Elderly female who appears with chronic illness  HENT:      Head: Normocephalic  Nose: No congestion or rhinorrhea  Mouth/Throat:      Mouth: Mucous membranes are moist       Pharynx: No oropharyngeal exudate  Eyes:      General: No scleral icterus  Right eye: No discharge  Left eye: No discharge  Conjunctiva/sclera: Conjunctivae normal       Pupils: Pupils are equal, round, and reactive to light  Cardiovascular:      Rate and Rhythm: Normal rate and regular rhythm  Pulses: Normal pulses  Pulmonary:      Effort: Pulmonary effort is normal  No respiratory distress  Comments: No audible wheezes or rhonchi noted  Abdominal:      General: Bowel sounds are normal  There is no distension  Palpations: Abdomen is soft  Tenderness: There is no abdominal tenderness  There is no guarding  Musculoskeletal:         General: Deformity present  Cervical back: Neck supple  No rigidity  Right lower leg: No edema  Left lower leg: No edema  Comments: Wheelchair for mobility at baseline   Lymphadenopathy:      Cervical: No cervical adenopathy  Skin:     General: Skin is warm and dry  Capillary Refill: Capillary refill takes less than 2 seconds  Neurological:      General: No focal deficit present  Mental Status: She is alert  Mental status is at baseline  Cranial Nerves: No cranial nerve deficit  Motor: Weakness present  Gait: Gait abnormal       Comments: Alert and oriented to self  Patient answered appropriately to questions     Psychiatric:         Mood and Affect: Mood normal          Behavior: Behavior normal          Thought Content: Thought content normal  Thought content is not delusional          Cognition and Memory: Cognition is impaired  Memory is impaired  Assessment/Plan:  49-year-old female with:    COVID-19  Patient tested positive for COVID-19 on 02/15/2022 and currently in the [facility] unit for isolation precautions  Respiratory status is stable on room air  D-dimer 1 57 and C reactive protein 67 7 on 02/16/2022; patient was started on dexamethasone 6 mg daily and heparin 5000 units subQ b i d  for 10 days  Recheck D-dimer and C-reactive protein next week  Continue vitamin-C, vitamin-D, and zinc for a total of 2 weeks  Continue to monitor closely as patient is at high risk for rapid deterioration due to age and comorbidities    Osteoarthritis  Pain controlled with scheduled Tylenol, gabapentin, and lidocaine patch    Dementia (HCC)  Alzheimer's versus mixed type  Continue 24/7 care and support at LTCF for ADLs; IADLs  No behavior issues reported  Patient requires complete assist with ADLs  Continue delirium precautions  Avoid deliriogenic medications  Maintain sleep/wake cycle  Monitor for pain and ensure that pain is adequately controlled  Monitor bowel bladder function to ensure that patient does not have urinary retention or constipation  Consider discontinuing Seroquel    Debility  Multifactorial  Continue 24/7 care and support at LTCF  PT/OT/ST as needed  Continue fall precautions  Ensure adequate hydration and nutrition  Management of acute chronic medical conditions    Mitral regurgitation  Stable  Patient was last seen by Cardiology in 2017  Patient appears euvolemic on exam  Continue Lasix 20 mg daily    Benign essential hypertension  BPs remains stable  Patient is currently not on any antihypertensive medications  Continue Lasix 20 mg daily    This note was completed in part utilizing m-modal fluency direct voice recognition software    Grammatical errors, random word insertion, spelling mistakes, and incomplete sentences may be an occasional consequence of the system secondary to software limitations, ambient noise and hardware issues  At the time of dictation, efforts were made to edit, clarify and/or correct errors  Please read the chart carefully and recognize, using context, where substitutions have occurred  If you have any questions or concerns about the context, text or information contained within the body of this dictation, please contact myself, the provider, for further clarification      Gloria Vasquez 79, [address] [facility]  8/86/41420:93 PM

## 2022-03-03 ENCOUNTER — NURSING HOME VISIT (OUTPATIENT)
Dept: GERIATRICS | Facility: OTHER | Age: 87
End: 2022-03-03
Payer: MEDICARE

## 2022-03-03 DIAGNOSIS — N18.30 STAGE 3 CHRONIC KIDNEY DISEASE, UNSPECIFIED WHETHER STAGE 3A OR 3B CKD (HCC): ICD-10-CM

## 2022-03-03 DIAGNOSIS — G30.1 LATE ONSET ALZHEIMER'S DEMENTIA WITH BEHAVIORAL DISTURBANCE (HCC): ICD-10-CM

## 2022-03-03 DIAGNOSIS — F02.81 LATE ONSET ALZHEIMER'S DEMENTIA WITH BEHAVIORAL DISTURBANCE (HCC): ICD-10-CM

## 2022-03-03 DIAGNOSIS — R19.7 DIARRHEA, UNSPECIFIED TYPE: Primary | ICD-10-CM

## 2022-03-03 PROCEDURE — 99309 SBSQ NF CARE MODERATE MDM 30: CPT | Performed by: FAMILY MEDICINE

## 2022-03-03 NOTE — PROGRESS NOTES
[facility]  Br[address] Hebert [address]  [phone]  [facility]: nursing home place of service: POS [employer]- No Part A Coverage      NAME: Marlene Becerril  AGE: 80 y o  SEX: female [id number]    DATE OF ENCOUNTER: 3/3/22    Assessment and Plan     Problem List Items Addressed This Visit        Nervous and Auditory    Dementia ([address] )     Stable no behaviors reported  Patient needs assistance with IADLs and ADLs  Continue delirium precautions patient is high risk currently a due to new onset diarrhea  Monitor closely maintain sleep-wake cycle  Encourage p o  hydration  Discontinue Seroquel  Continue to provide supportive care and reorient as needed            Genitourinary    CKD (chronic kidney disease) stage 3, GFR 30-59 ml/min ([facility])     Patient currently at risk for dehydration  Encourage p o  fluids  Check CMP            Other    Diarrhea - Primary     New onset of watery nonbloody  Hold any stool softeners or laxatives  Encourage p o  intake offer Getorade  Will check blood in stool stool cultures and C diff  Order CBC CMP lipase TSH and follow-up with results  Monitor vital signs closely                   Chief Complaint     Diarrhea    History of Present Illness     Ricardo Mackenzie is a 80 y o  female who was seen today for follow up  Patient seen and examined at bedside as per nursing request due to multiple episodes of watery diarrhea that started today  No fever or chills  Appetite is decreased and patient seems more tired than her usual   Patient with dementia at baseline not able to provide a good history she was able to answer some of my questions  Did not appear in acute distress at the time of encounter            The following portions of the patient's history were reviewed and updated as appropriate: allergies, current medications, past family history, past medical history, past social history, past surgical history and problem list     Review of Systems     Review of Systems   Constitutional: Negative for fever  Respiratory: Negative for cough and shortness of breath  Cardiovascular: Negative for chest pain  Gastrointestinal: Negative for abdominal pain and nausea  Genitourinary: Negative for dysuria  ROS limited due to dementia    Active Problem List     Patient Active Problem List   Diagnosis    Benign essential hypertension    Benign paroxysmal vertigo    [place] (hard of hearing)    Hyperlipidemia    Mitral regurgitation    Osteoarthritis    Lumbar radiculopathy    CKD (chronic kidney disease) stage 3, GFR 30-59 ml/min ([facility])    Depression with anxiety    GERD (gastroesophageal reflux disease)    Debility    Dementia ([facility] [address] [facility]    Encounter for medication review    COVID-19    Diarrhea       Objective     Vital Signs:     Blood pressure 139/82 Heart Rate: 97 6 Respiratory Rate 18   Temperature 97 6 Oxygen Saturation 95%RA Weight 153 2lbs    Physical Exam  Vitals and nursing note reviewed  Constitutional:       General: She is not in acute distress  Appearance: She is not diaphoretic  HENT:      Head: Normocephalic and atraumatic  Ears:      Comments: [place]     Mouth/Throat:      Mouth: Mucous membranes are dry  Eyes:      General:         Right eye: No discharge  Left eye: No discharge  Pupils: Pupils are equal, round, and reactive to light  Cardiovascular:      Rate and Rhythm: Normal rate and regular rhythm  Heart sounds: Normal heart sounds  No murmur heard  Pulmonary:      Effort: Pulmonary effort is normal  No respiratory distress  Breath sounds: Normal breath sounds  No wheezing  Abdominal:      General: Bowel sounds are normal       Palpations: Abdomen is soft  Tenderness: There is no abdominal tenderness  There is no guarding or rebound  Musculoskeletal:      Cervical back: Normal range of motion and neck supple  Right lower leg: No edema        Left lower leg: No edema  Skin:     General: Skin is warm and dry  Neurological:      Mental Status: She is alert  Mental status is at baseline  Comments: AAox1   Psychiatric:         Behavior: Behavior normal          Pertinent Laboratory/Diagnostic Studies:  Laboratory and Imaging studies reviewed  Full report in the paper chart  Current Medications   Medications reviewed and updated in facility chart      Name: Jillian Suresh  : 1929  MRN: [id number]        Shea Dawn MD  Geriatric Medicine  3/3/22 6:02 PM

## 2022-03-06 PROBLEM — R19.7 DIARRHEA: Status: ACTIVE | Noted: 2022-03-06

## 2022-03-06 NOTE — ASSESSMENT & PLAN NOTE
New onset of watery nonbloody  Hold any stool softeners or laxatives  Encourage p o  intake offer Getorade  Will check blood in stool stool cultures and C diff  Order CBC CMP lipase TSH and follow-up with results  Monitor vital signs closely

## 2022-03-06 NOTE — ASSESSMENT & PLAN NOTE
Stable no behaviors reported  Patient needs assistance with IADLs and ADLs  Continue delirium precautions patient is high risk currently a due to new onset diarrhea    Monitor closely maintain sleep-wake cycle  Encourage p o  hydration  Discontinue Seroquel  Continue to provide supportive care and reorient as needed

## 2022-03-22 ENCOUNTER — NURSING HOME VISIT (OUTPATIENT)
Dept: GERIATRICS | Facility: OTHER | Age: 87
End: 2022-03-22
Payer: MEDICARE

## 2022-03-22 DIAGNOSIS — F02.81 LATE ONSET ALZHEIMER'S DEMENTIA WITH BEHAVIORAL DISTURBANCE (HCC): Primary | ICD-10-CM

## 2022-03-22 DIAGNOSIS — F41.8 DEPRESSION WITH ANXIETY: ICD-10-CM

## 2022-03-22 DIAGNOSIS — I10 BENIGN ESSENTIAL HYPERTENSION: ICD-10-CM

## 2022-03-22 DIAGNOSIS — N18.30 STAGE 3 CHRONIC KIDNEY DISEASE, UNSPECIFIED WHETHER STAGE 3A OR 3B CKD (HCC): ICD-10-CM

## 2022-03-22 DIAGNOSIS — I34.0 NONRHEUMATIC MITRAL VALVE REGURGITATION: ICD-10-CM

## 2022-03-22 DIAGNOSIS — G30.1 LATE ONSET ALZHEIMER'S DEMENTIA WITH BEHAVIORAL DISTURBANCE (HCC): Primary | ICD-10-CM

## 2022-03-22 PROCEDURE — 99309 SBSQ NF CARE MODERATE MDM 30: CPT | Performed by: NURSE PRACTITIONER

## 2022-03-22 NOTE — PROGRESS NOTES
[facility]: [facility]  POS: 28 (LT)  Progress Note    Chief Complaint/Reason for visit: LTC follow up  History of Present Illness:  77-year-old female seen and examined for follow-up of chronic medical conditions  Received patient resting in bed and appeared in no distress  Patient tested positive for COVID-19 on 02/15/2022 and recovered without any residual symptoms  Patient was seen by PCP on 03/03/2022 due to diarrhea; stool tests were negative and diarrhea resolved  She is currently on a regular diet with thin liquids and receiving speech therapy  Most recent CBC and CMP stable for patient  Patient was able to feed herself  She spoke [place] and also [place]  Patient denies having pain or discomfort  No behavior issues reported  No acute concerns reported by nursing  Past Medical History: unchanged from history and physical  Past Medical History:   Diagnosis Date    Depression     GERD (gastroesophageal reflux disease)     Hyperlipidemia     Hypertension      Family History: unchanged from history and physical  Social History: unchanged from history and physical  Resident Since:  12/21/2020  Review of systems: Review of Systems   Unable to perform ROS: Dementia   Constitutional: Negative for appetite change, chills, diaphoresis and fatigue  HENT: Negative for congestion  Respiratory: Negative for cough and shortness of breath  Cardiovascular: Negative for chest pain  Gastrointestinal: Negative for abdominal pain, constipation, diarrhea, nausea and vomiting  Genitourinary: Negative for difficulty urinating  Neurological: Negative for dizziness and headaches  Psychiatric/Behavioral: Negative for agitation, behavioral problems and hallucinations  The patient is not nervous/anxious  Medications: All medication and routine orders were reviewed and updated  Allergies: NKDA  Consults reviewed: Other  Labs/Diagnostics (reviewed by this provider): Copy in Chart electronic medical records and [facility] lab site    Imaging Reviewed:  None today    Physical Exam    Weight:  153 2 lb Temp:  97 2         BP:  126/74  Pulse:  72 Resp: 16      O2 Sat:  Room air  Constitutional: Normocephalic  Orientation:Person     Physical Exam  Vitals and nursing note reviewed  Exam conducted with a chaperone present (NP student present)  Constitutional:       General: She is not in acute distress  Appearance: She is not ill-appearing, toxic-appearing or diaphoretic  Comments: Elderly female who appears in no distress  HENT:      Head: Normocephalic  Nose: No congestion or rhinorrhea  Mouth/Throat:      Mouth: Mucous membranes are moist       Pharynx: No oropharyngeal exudate  Eyes:      General: No scleral icterus  Right eye: No discharge  Left eye: No discharge  Extraocular Movements: Extraocular movements intact  Conjunctiva/sclera: Conjunctivae normal       Pupils: Pupils are equal, round, and reactive to light  Cardiovascular:      Rate and Rhythm: Normal rate and regular rhythm  Pulses: Normal pulses  Pulmonary:      Effort: Pulmonary effort is normal  No respiratory distress  Breath sounds: Normal breath sounds  No wheezing, rhonchi or rales  Abdominal:      General: Bowel sounds are normal  There is no distension  Palpations: Abdomen is soft  Tenderness: There is no abdominal tenderness  There is no guarding  Musculoskeletal:      Cervical back: Neck supple  No rigidity  Right lower leg: Edema (Trace nonpitting edema) present  Left lower leg: Edema (Trace nonpitting edema) present  Comments: Moves all 4 extremities  Lymphadenopathy:      Cervical: No cervical adenopathy  Skin:     General: Skin is warm and dry  Capillary Refill: Capillary refill takes less than 2 seconds  Neurological:      General: No focal deficit present  Mental Status: She is alert  Motor: Weakness present        Gait: Gait abnormal  Comments: Alert and oriented to self  Patient speaks [place] and [facility]  Psychiatric:         Mood and Affect: Mood normal          Behavior: Behavior normal          Cognition and Memory: Cognition is impaired  Memory is impaired  Assessment/Plan:  25-year-old female with:    Dementia [facility])  Patient is alert and oriented to self  Pleasantly confused  No behavior issues reported  Patient requires assistance with all ADLs; IADLs  Continue 24/7 care and support at LTCF   Continue delirium precautions  Avoid delirium genic medications  Monitor sleep/wake cycle    Depression with anxiety  Mood appears stable  Continue Lexapro 10 mg daily  Seroquel recently discontinued and no behavior issues reported    Benign essential hypertension  BPs stable  Continue Lasix 20 mg daily  Most recent CMP stable    CKD (chronic kidney disease) stage 3, GFR 30-59 ml/min  Lab Results   Component Value Date    CREATININE 0 88 07/29/2014    CREATININE 0 74 04/26/2013   Most recent CMP stable for patient  Ensure adequate hydration  Avoid hypotension  Monitor renal status routinely    Mitral regurgitation  Stable and appears euvolemic  Continue Lasix 20 mg daily    COVID 19 recovered    This note was completed in part utilizing m-[employer] fluency direct voice recognition software  Grammatical errors, random word insertion, spelling mistakes, and incomplete sentences may be an occasional consequence of the system secondary to software limitations, ambient noise and hardware issues  At the time of dictation, efforts were made to edit, clarify and/or correct errors  Please read the chart carefully and recognize, using context, where substitutions have occurred  If you have any questions or concerns about the context, text or information contained within the body of this dictation, please contact myself, the provider, for further clarification      Gloria Vasquez 79, [address] Preet[address]   8/85/17633:46 PM

## 2022-03-22 NOTE — ASSESSMENT & PLAN NOTE
Patient is alert and oriented to self  Pleasantly confused    No behavior issues reported  Patient requires assistance with all ADLs; IADLs  Continue 24/7 care and support at LTCF   Continue delirium precautions  Avoid delirium genic medications  Monitor sleep/wake cycle

## 2022-03-22 NOTE — ASSESSMENT & PLAN NOTE
Mood appears stable  Continue Lexapro 10 mg daily  Seroquel recently discontinued and no behavior issues reported

## 2022-03-22 NOTE — ASSESSMENT & PLAN NOTE
Lab Results   Component Value Date    CREATININE 0 88 07/29/2014    CREATININE 0 74 04/26/2013   Most recent CMP stable for patient  Ensure adequate hydration  Avoid hypotension  Monitor renal status routinely

## 2022-04-05 ENCOUNTER — NURSING HOME VISIT (OUTPATIENT)
Dept: GERIATRICS | Facility: OTHER | Age: 87
End: 2022-04-05
Payer: MEDICARE

## 2022-04-05 DIAGNOSIS — R63.0 POOR APPETITE: Primary | ICD-10-CM

## 2022-04-05 DIAGNOSIS — F02.81 LATE ONSET ALZHEIMER'S DEMENTIA WITH BEHAVIORAL DISTURBANCE (HCC): ICD-10-CM

## 2022-04-05 DIAGNOSIS — G30.1 LATE ONSET ALZHEIMER'S DEMENTIA WITH BEHAVIORAL DISTURBANCE (HCC): ICD-10-CM

## 2022-04-05 DIAGNOSIS — R53.81 DEBILITY: ICD-10-CM

## 2022-04-05 PROBLEM — R19.7 DIARRHEA: Status: RESOLVED | Noted: 2022-03-06 | Resolved: 2022-04-05

## 2022-04-05 PROCEDURE — 99309 SBSQ NF CARE MODERATE MDM 30: CPT | Performed by: NURSE PRACTITIONER

## 2022-04-05 NOTE — PROGRESS NOTES
Facility: [facility]  POS: 28 ([facility])  Progress Note    Chief Complaint/Reason for visit: Acute Med Note  History obtained from patient's nurse and EMR  History of Present Illness:  12-year-old female seen and examined at the request of nursing for concern of decreased appetite and oral intake  Patient's nurse reports that her appetite has been poor since yesterday  She did eat some lunch today but is refusing to drink fluids  She used to be a hospice care patient but hospice discontinued services because patient remains stable and condition did not deteriorate  She is COVID recovered without any residual symptoms  She appeared much different as compared to exam on 03/22/2022  She usually is alert and attentive and able to feed herself  She received speech therapy in March  Before I order blood work, I called patient's grandson who is POA to discuss plan of care with him before moving forward but he was not available in voice message left for him to call me  Patient's vital signs are stable and no acute findings noted on exam other than patient appears weak and dehydrated from not drinking fluids  Past Medical History: unchanged from history and physical  Past Medical History:   Diagnosis Date    Depression     GERD (gastroesophageal reflux disease)     Hyperlipidemia     Hypertension      Family History: unchanged from history and physical  Social History: unchanged from history and physical  Resident Since:  12/21/2020  Review of systems: Review of Systems   Unable to perform ROS: Dementia   Gastrointestinal: Negative for constipation, diarrhea and vomiting  Psychiatric/Behavioral: Negative for behavioral problems  Medications: All medication and routine orders were reviewed and updated  Allergies: NKDA  Consults reviewed: Other  Labs/Diagnostics (reviewed by this provider): Copy in Chart    Imaging Reviewed:    Physical Exam    Weight:  153 2 lb Temp:  97 8         BP:  134/72   Pulse:  76 Resp: 20 O2 Sat:  95% on room air  Constitutional: Normocephalic  Orientation: Confused and disoriented    Physical Exam  Vitals and nursing note reviewed  Constitutional:       General: She is not in acute distress  Appearance: She is ill-appearing  She is not toxic-appearing or diaphoretic  Comments: Frail elderly female who appears with chronic illness  HENT:      Head: Normocephalic  Nose: No congestion or rhinorrhea  Mouth/Throat:      Mouth: Mucous membranes are dry  Pharynx: No oropharyngeal exudate  Eyes:      General:         Right eye: No discharge  Left eye: No discharge  Conjunctiva/sclera: Conjunctivae normal    Cardiovascular:      Rate and Rhythm: Normal rate  Pulmonary:      Effort: Pulmonary effort is normal       Breath sounds: Normal breath sounds  Abdominal:      General: Bowel sounds are normal  There is no distension  Palpations: Abdomen is soft  Tenderness: There is no abdominal tenderness  There is no guarding  Musculoskeletal:      Cervical back: Neck supple  No rigidity  Right lower leg: No edema  Left lower leg: No edema  Comments: Moves all 4 extremities  Lymphadenopathy:      Cervical: No cervical adenopathy  Skin:     General: Skin is warm and dry  Capillary Refill: Capillary refill takes less than 2 seconds  Neurological:      Mental Status: She is alert  Motor: Weakness present  Comments: Alert, confused, and disoriented  Inattentive during exam   Psychiatric:         Mood and Affect: Mood normal          Behavior: Behavior normal          Thought Content: Thought content normal        Assessment/Plan:  80-year-old female with:    Poor appetite  Nursing reports that patient's appetite has decreased since yesterday and remains poor today  Patient's tongue is very dry and refused to drink liquids for me at time of visit    She was alert but inattentive and did not follow directions    Dementia [facility])  Patient is alert, confused, and disoriented today  No behavior issues reported  Continue 24/7 care and support at LTCF  Continue delirium precautions  Avoid deliriogenic medications  Monitor for pain and ensure that pain is adequately controlled    Debility  Multifactorial  Continue 24/7 care and support at LTCF for ADLs; IADLs  PT/OT/ST as needed  Continue fall precautions  Provide nutritional support  Management of acute and chronic medical conditions    This note was completed in part utilizing m-[employer] fluency direct voice recognition software  Grammatical errors, random word insertion, spelling mistakes, and incomplete sentences may be an occasional consequence of the system secondary to software limitations, ambient noise and hardware issues  At the time of dictation, efforts were made to edit, clarify and/or correct errors  Please read the chart carefully and recognize, using context, where substitutions have occurred  If you have any questions or concerns about the context, text or information contained within the body of this dictation, please contact myself, the provider, for further clarification      Gloria Vaqsuez 79, [address] Jackson  7/1/21436:40 PM

## 2022-04-05 NOTE — ASSESSMENT & PLAN NOTE
Multifactorial  Continue 24/7 care and support at LTCF for ADLs; IADLs  PT/OT/ST as needed  Continue fall precautions  Provide nutritional support  Management of acute and chronic medical conditions

## 2022-04-05 NOTE — ASSESSMENT & PLAN NOTE
Patient is alert, confused, and disoriented today  No behavior issues reported  Continue 24/7 care and support at LTCF  Continue delirium precautions  Avoid deliriogenic medications  Monitor for pain and ensure that pain is adequately controlled

## 2022-04-05 NOTE — ASSESSMENT & PLAN NOTE
Nursing reports that patient's appetite has decreased since yesterday and remains poor today  Patient's tongue is very dry and refused to drink liquids for me at time of visit    She was alert but inattentive and did not follow directions

## 2022-04-12 ENCOUNTER — NURSING HOME VISIT (OUTPATIENT)
Dept: GERIATRICS | Facility: OTHER | Age: 87
End: 2022-04-12
Payer: MEDICARE

## 2022-04-12 DIAGNOSIS — R63.0 POOR APPETITE: Primary | ICD-10-CM

## 2022-04-12 PROCEDURE — 99307 SBSQ NF CARE SF MDM 10: CPT | Performed by: NURSE PRACTITIONER

## 2022-04-12 NOTE — PROGRESS NOTES
Tee   POS: 32 ([facility])  Acute Med Note    Assessment/Plan:    Poor appetite  Patient has lost 6 lb since January 2022  Patient's appetite appears to go up and down  Nursing reports that patient has been eating now up to 75%  Observed patient eating and swallowing  No difficulty swallowing noted  Will continue to monitor and consult dietitian as needed   Monitor weights     Subjective: No complaints at time of exam      Patient ID: Tejal Casper is a 80 y o  female  77-year-old female seen and examined for follow-up of poor appetite  Received patient resting in bed  She was alert and oriented to self and stated her birthday accurately  Patient appears in no distress  Patient was speaking [place] and [place]  Patient stated "I love to eat"  Nursing reports that patient has been eating up to 75% of her meals  Patient has lost 6 lb since January 2022  I held a cup to her today and ask her to drink she was able to swallow fluids without difficulty  I called patient's grandson last week and left him a message to call me  I never received a call from her grandson  I called patient's son today and grandson again and left a voicemail requesting that they call me so I can update them on her current condition  Review of Systems   Constitutional: Negative for chills and diaphoresis  HENT: Negative for congestion  Respiratory: Negative for cough and shortness of breath  Cardiovascular: Negative for chest pain  Gastrointestinal: Negative for abdominal pain  Neurological: Negative for dizziness and speech difficulty  Psychiatric/Behavioral: Negative for agitation and behavioral problems  The patient is not nervous/anxious  All other systems reviewed and are negative  Objective:  Weight: 154 4 lb   BP: 134/72  temp: 97 5°   heart rate: 76  resp: 20     Physical Exam  Vitals and nursing note reviewed  Constitutional:       General: She is not in acute distress       Appearance: She is not ill-appearing, toxic-appearing or diaphoretic  Comments: Elderly female who appears with chronic illness  HENT:      Head: Normocephalic  Nose: No congestion or rhinorrhea  Mouth/Throat:      Mouth: Mucous membranes are moist       Pharynx: No oropharyngeal exudate  Eyes:      General: No scleral icterus  Right eye: No discharge  Left eye: No discharge  Extraocular Movements: Extraocular movements intact  Conjunctiva/sclera: Conjunctivae normal       Pupils: Pupils are equal, round, and reactive to light  Cardiovascular:      Rate and Rhythm: Normal rate  Pulses: Normal pulses  Pulmonary:      Effort: Pulmonary effort is normal  No respiratory distress  Breath sounds: Normal breath sounds  No wheezing, rhonchi or rales  Abdominal:      General: Bowel sounds are normal  There is no distension  Palpations: Abdomen is soft  Tenderness: There is no abdominal tenderness  There is no guarding  Musculoskeletal:      Cervical back: Neck supple  No rigidity  Right lower leg: No edema  Left lower leg: No edema  Comments: Moves all 4 extremities  Lymphadenopathy:      Cervical: No cervical adenopathy  Skin:     General: Skin is warm and dry  Capillary Refill: Capillary refill takes less than 2 seconds  Neurological:      General: No focal deficit present  Mental Status: She is alert  Mental status is at baseline  Psychiatric:         Mood and Affect: Mood normal          Behavior: Behavior normal          Thought Content:  Thought content normal

## 2022-04-12 NOTE — ASSESSMENT & PLAN NOTE
Patient has lost 6 lb since January 2022  Patient's appetite appears to go up and down  Nursing reports that patient has been eating now up to 75%  Observed patient eating and swallowing    No difficulty swallowing noted  Will continue to monitor and consult dietitian as needed   Monitor weights

## 2022-04-14 ENCOUNTER — NURSING HOME VISIT (OUTPATIENT)
Dept: GERIATRICS | Facility: OTHER | Age: 87
End: 2022-04-14
Payer: MEDICARE

## 2022-04-14 DIAGNOSIS — R63.0 POOR APPETITE: ICD-10-CM

## 2022-04-14 DIAGNOSIS — I10 BENIGN ESSENTIAL HYPERTENSION: ICD-10-CM

## 2022-04-14 DIAGNOSIS — G30.1 LATE ONSET ALZHEIMER'S DEMENTIA WITH BEHAVIORAL DISTURBANCE (HCC): ICD-10-CM

## 2022-04-14 DIAGNOSIS — N18.30 STAGE 3 CHRONIC KIDNEY DISEASE, UNSPECIFIED WHETHER STAGE 3A OR 3B CKD (HCC): Primary | ICD-10-CM

## 2022-04-14 DIAGNOSIS — F41.8 DEPRESSION WITH ANXIETY: ICD-10-CM

## 2022-04-14 DIAGNOSIS — F02.81 LATE ONSET ALZHEIMER'S DEMENTIA WITH BEHAVIORAL DISTURBANCE (HCC): ICD-10-CM

## 2022-04-14 PROCEDURE — 99309 SBSQ NF CARE MODERATE MDM 30: CPT | Performed by: FAMILY MEDICINE

## 2022-04-14 NOTE — ASSESSMENT & PLAN NOTE
P o  Intake improves since patient is of her finger foods  Speech evaluation pending  Continue to monitor weights

## 2022-04-14 NOTE — PROGRESS NOTES
[address]  [phone]  [facility] of Service: nursing home place of service: POS 31 [facility]illed [facility] Coverage      NAME: Marlene Becerril  AGE: 80 y o  SEX: female [id number]    DATE OF ENCOUNTER: 4/14/2022    Assessment and Plan     Problem List Items Addressed This Visit        Cardiovascular and Mediastinum    Benign essential hypertension     Blood pressure well controlled  Patient currently taking Lasix 20 mg daily  Continue to monitor  Monitor CMP periodically  Consider tapering down Lasix, monitor weights            Nervous and Auditory    Dementia (HCC)     Stable , no behavioral issues noted  Will continue supportive care  Maintain sleep Marko Parrot cycle  Maintain good po  hydration, avoid constipation  Genitourinary    CKD (chronic kidney disease) stage 3, GFR 30-59 ml/min ([facility]) - Primary     Creatinine stable  Will avoid nephrotoxic medication  Encourage po hydration  Will monitor BMP  Other    Depression with anxiety     Stable  Continue Lexapro 10 mg daily  Continue to provide supportive care         Poor appetite     P o  Intake improves since patient is of her finger foods  Speech evaluation pending  Continue to monitor weights                   Chief Complaint     Follow up     History of Present Illness     Jcarlos Norton is a 80 y o  female who was seen today for follow up  Patient seen and examined at bedside for follow-up  Patient is alert oriented times 0 at baseline  Denies any acute complaints at the time of encounter  No chest pain shortness of breath cough  No abdominal pain nausea  She states that her appetite is good  Denies difficulty sleeping  Per nursing staff her diet was changed to finger food and she seems to eat better  Speech therapy evaluation pending    No change in weight noted          The following portions of the patient's history were reviewed and updated as appropriate: allergies, current medications, past family history, past medical history, past social history, past surgical history and problem list     Review of Systems     Review of Systems   Constitutional: Negative for chills and fever  HENT: Negative for congestion  Respiratory: Negative for cough, shortness of breath and wheezing  Cardiovascular: Negative for chest pain, palpitations and leg swelling  Gastrointestinal: Negative for abdominal pain and constipation  Endocrine: Negative for cold intolerance  Genitourinary: Negative for difficulty urinating, dysuria and hematuria  Musculoskeletal: Positive for gait problem  Skin: Negative for wound  Allergic/Immunologic: Negative for environmental allergies  Neurological: Negative for dizziness and headaches  Hematological: Does not bruise/bleed easily  Psychiatric/Behavioral: Negative for sleep disturbance  Active Problem List     Patient Active Problem List   Diagnosis    Benign essential hypertension    [place] ([place] of hearing)    Hyperlipidemia    Mitral regurgitation    Osteoarthritis    Lumbar radiculopathy    CKD (chronic kidney disease) stage 3, GFR 30-59 ml/min ([facility])    Depression with anxiety    GERD (gastroesophageal reflux disease)    Debility    Dementia ([facility] [address] )    Encounter for medication review    COVID-19    Poor appetite       Objective     Vital Signs:     Blood pressure 134/72 Heart Rate: 76 Respiratory Rate 20   Temperature 97 8 Oxygen Saturation 95% Weight 154 4lbs    Physical Exam  Vitals and nursing note reviewed  Constitutional:       General: She is not in acute distress  Appearance: She is not diaphoretic  Comments: [place]   HENT:      Head: Normocephalic and atraumatic  Mouth/Throat:      Mouth: Mucous membranes are dry  Eyes:      General:         Right eye: No discharge  Left eye: No discharge  Pupils: Pupils are equal, round, and reactive to light     Cardiovascular: Rate and Rhythm: Normal rate and regular rhythm  Heart sounds: Murmur heard  Pulmonary:      Effort: Pulmonary effort is normal  No respiratory distress  Breath sounds: Normal breath sounds  No wheezing  Abdominal:      Palpations: Abdomen is soft  Tenderness: There is no abdominal tenderness  There is no guarding or rebound  Musculoskeletal:      Cervical back: Normal range of motion and neck supple  Right lower leg: Edema (trace) present  Left lower leg: Edema (trace) present  Comments: wheelchair   Skin:     General: Skin is warm and dry  Neurological:      Mental Status: She is alert  Mental status is at baseline  Comments: AAOx0   Psychiatric:         Behavior: Behavior normal          Pertinent Laboratory/Diagnostic Studies:  Laboratory and Imaging studies reviewed  Full report in the paper chart  Current Medications   Medications reviewed and updated in facility chart      Name: Edin Krishna  : 1929  MRN: [id number]        Priti Fang MD  Geriatric Medicine  2022 12:44 PM

## 2022-04-14 NOTE — ASSESSMENT & PLAN NOTE
Blood pressure well controlled  Patient currently taking Lasix 20 mg daily  Continue to monitor  Monitor CMP periodically  Consider tapering down Lasix, monitor weights

## 2022-04-14 NOTE — ASSESSMENT & PLAN NOTE
Stable , no behavioral issues noted  Will continue supportive care  Maintain sleep Gab  cycle  Maintain good po  hydration, avoid constipation